# Patient Record
Sex: MALE | Race: WHITE | NOT HISPANIC OR LATINO
[De-identification: names, ages, dates, MRNs, and addresses within clinical notes are randomized per-mention and may not be internally consistent; named-entity substitution may affect disease eponyms.]

---

## 2018-06-28 ENCOUNTER — TRANSCRIPTION ENCOUNTER (OUTPATIENT)
Age: 33
End: 2018-06-28

## 2018-10-18 ENCOUNTER — TRANSCRIPTION ENCOUNTER (OUTPATIENT)
Age: 33
End: 2018-10-18

## 2021-09-17 ENCOUNTER — TRANSCRIPTION ENCOUNTER (OUTPATIENT)
Age: 36
End: 2021-09-17

## 2021-09-18 ENCOUNTER — INPATIENT (INPATIENT)
Facility: HOSPITAL | Age: 36
LOS: 0 days | Discharge: HOME | DRG: 342 | End: 2021-09-19
Attending: SURGERY | Admitting: SURGERY
Payer: MEDICAID

## 2021-09-18 ENCOUNTER — RESULT REVIEW (OUTPATIENT)
Age: 36
End: 2021-09-18

## 2021-09-18 VITALS
OXYGEN SATURATION: 99 % | TEMPERATURE: 98 F | HEIGHT: 68 IN | SYSTOLIC BLOOD PRESSURE: 123 MMHG | DIASTOLIC BLOOD PRESSURE: 84 MMHG | RESPIRATION RATE: 16 BRPM | WEIGHT: 315 LBS | HEART RATE: 86 BPM

## 2021-09-18 DIAGNOSIS — R10.9 UNSPECIFIED ABDOMINAL PAIN: ICD-10-CM

## 2021-09-18 DIAGNOSIS — K35.80 UNSPECIFIED ACUTE APPENDICITIS: ICD-10-CM

## 2021-09-18 DIAGNOSIS — Z98.890 OTHER SPECIFIED POSTPROCEDURAL STATES: Chronic | ICD-10-CM

## 2021-09-18 LAB
ALBUMIN SERPL ELPH-MCNC: 3.3 G/DL — SIGNIFICANT CHANGE UP (ref 3.3–5)
ALP SERPL-CCNC: 74 U/L — SIGNIFICANT CHANGE UP (ref 40–120)
ALT FLD-CCNC: 72 U/L — SIGNIFICANT CHANGE UP (ref 12–78)
AMYLASE P1 CFR SERPL: 30 U/L — SIGNIFICANT CHANGE UP (ref 25–125)
ANION GAP SERPL CALC-SCNC: 8 MMOL/L — SIGNIFICANT CHANGE UP (ref 5–17)
APPEARANCE UR: ABNORMAL
APTT BLD: 36.8 SEC — HIGH (ref 27.5–35.5)
AST SERPL-CCNC: 40 U/L — HIGH (ref 15–37)
BACTERIA # UR AUTO: ABNORMAL
BASOPHILS # BLD AUTO: 0.03 K/UL — SIGNIFICANT CHANGE UP (ref 0–0.2)
BASOPHILS NFR BLD AUTO: 0.3 % — SIGNIFICANT CHANGE UP (ref 0–2)
BILIRUB SERPL-MCNC: 1.1 MG/DL — SIGNIFICANT CHANGE UP (ref 0.2–1.2)
BILIRUB UR-MCNC: NEGATIVE — SIGNIFICANT CHANGE UP
BUN SERPL-MCNC: 11 MG/DL — SIGNIFICANT CHANGE UP (ref 7–23)
CALCIUM SERPL-MCNC: 8.6 MG/DL — SIGNIFICANT CHANGE UP (ref 8.5–10.1)
CHLORIDE SERPL-SCNC: 106 MMOL/L — SIGNIFICANT CHANGE UP (ref 96–108)
CO2 SERPL-SCNC: 27 MMOL/L — SIGNIFICANT CHANGE UP (ref 22–31)
COLOR SPEC: YELLOW — SIGNIFICANT CHANGE UP
COMMENT - URINE: SIGNIFICANT CHANGE UP
CREAT SERPL-MCNC: 0.83 MG/DL — SIGNIFICANT CHANGE UP (ref 0.5–1.3)
DIFF PNL FLD: NEGATIVE — SIGNIFICANT CHANGE UP
EOSINOPHIL # BLD AUTO: 0.12 K/UL — SIGNIFICANT CHANGE UP (ref 0–0.5)
EOSINOPHIL NFR BLD AUTO: 1 % — SIGNIFICANT CHANGE UP (ref 0–6)
EPI CELLS # UR: SIGNIFICANT CHANGE UP
GLUCOSE SERPL-MCNC: 103 MG/DL — HIGH (ref 70–99)
GLUCOSE UR QL: NEGATIVE — SIGNIFICANT CHANGE UP
HCT VFR BLD CALC: 46.8 % — SIGNIFICANT CHANGE UP (ref 39–50)
HGB BLD-MCNC: 16.5 G/DL — SIGNIFICANT CHANGE UP (ref 13–17)
IMM GRANULOCYTES NFR BLD AUTO: 0.3 % — SIGNIFICANT CHANGE UP (ref 0–1.5)
INR BLD: 1.61 RATIO — HIGH (ref 0.88–1.16)
KETONES UR-MCNC: ABNORMAL
LEUKOCYTE ESTERASE UR-ACNC: NEGATIVE — SIGNIFICANT CHANGE UP
LIDOCAIN IGE QN: 118 U/L — SIGNIFICANT CHANGE UP (ref 73–393)
LYMPHOCYTES # BLD AUTO: 1.6 K/UL — SIGNIFICANT CHANGE UP (ref 1–3.3)
LYMPHOCYTES # BLD AUTO: 13.4 % — SIGNIFICANT CHANGE UP (ref 13–44)
MCHC RBC-ENTMCNC: 33.7 PG — SIGNIFICANT CHANGE UP (ref 27–34)
MCHC RBC-ENTMCNC: 35.3 GM/DL — SIGNIFICANT CHANGE UP (ref 32–36)
MCV RBC AUTO: 95.7 FL — SIGNIFICANT CHANGE UP (ref 80–100)
MONOCYTES # BLD AUTO: 0.77 K/UL — SIGNIFICANT CHANGE UP (ref 0–0.9)
MONOCYTES NFR BLD AUTO: 6.5 % — SIGNIFICANT CHANGE UP (ref 2–14)
NEUTROPHILS # BLD AUTO: 9.36 K/UL — HIGH (ref 1.8–7.4)
NEUTROPHILS NFR BLD AUTO: 78.5 % — HIGH (ref 43–77)
NITRITE UR-MCNC: NEGATIVE — SIGNIFICANT CHANGE UP
NRBC # BLD: 0 /100 WBCS — SIGNIFICANT CHANGE UP (ref 0–0)
PH UR: 7 — SIGNIFICANT CHANGE UP (ref 5–8)
PLATELET # BLD AUTO: 225 K/UL — SIGNIFICANT CHANGE UP (ref 150–400)
POTASSIUM SERPL-MCNC: 3.5 MMOL/L — SIGNIFICANT CHANGE UP (ref 3.5–5.3)
POTASSIUM SERPL-SCNC: 3.5 MMOL/L — SIGNIFICANT CHANGE UP (ref 3.5–5.3)
PROT SERPL-MCNC: 7.5 G/DL — SIGNIFICANT CHANGE UP (ref 6–8.3)
PROT UR-MCNC: NEGATIVE — SIGNIFICANT CHANGE UP
PROTHROM AB SERPL-ACNC: 18.4 SEC — HIGH (ref 10.6–13.6)
RBC # BLD: 4.89 M/UL — SIGNIFICANT CHANGE UP (ref 4.2–5.8)
RBC # FLD: 12.9 % — SIGNIFICANT CHANGE UP (ref 10.3–14.5)
RBC CASTS # UR COMP ASSIST: SIGNIFICANT CHANGE UP /HPF (ref 0–4)
SARS-COV-2 RNA SPEC QL NAA+PROBE: SIGNIFICANT CHANGE UP
SODIUM SERPL-SCNC: 141 MMOL/L — SIGNIFICANT CHANGE UP (ref 135–145)
SP GR SPEC: 1.01 — SIGNIFICANT CHANGE UP (ref 1.01–1.02)
UROBILINOGEN FLD QL: NEGATIVE — SIGNIFICANT CHANGE UP
WBC # BLD: 11.92 K/UL — HIGH (ref 3.8–10.5)
WBC # FLD AUTO: 11.92 K/UL — HIGH (ref 3.8–10.5)
WBC UR QL: SIGNIFICANT CHANGE UP

## 2021-09-18 PROCEDURE — 99285 EMERGENCY DEPT VISIT HI MDM: CPT

## 2021-09-18 PROCEDURE — 88304 TISSUE EXAM BY PATHOLOGIST: CPT | Mod: 26

## 2021-09-18 PROCEDURE — 99223 1ST HOSP IP/OBS HIGH 75: CPT | Mod: 57

## 2021-09-18 PROCEDURE — 99233 SBSQ HOSP IP/OBS HIGH 50: CPT

## 2021-09-18 PROCEDURE — 44970 LAPAROSCOPY APPENDECTOMY: CPT | Mod: AS

## 2021-09-18 PROCEDURE — 44970 LAPAROSCOPY APPENDECTOMY: CPT

## 2021-09-18 PROCEDURE — 74177 CT ABD & PELVIS W/CONTRAST: CPT | Mod: 26,MA

## 2021-09-18 RX ORDER — PIPERACILLIN AND TAZOBACTAM 4; .5 G/20ML; G/20ML
3.38 INJECTION, POWDER, LYOPHILIZED, FOR SOLUTION INTRAVENOUS EVERY 8 HOURS
Refills: 0 | Status: DISCONTINUED | OUTPATIENT
Start: 2021-09-18 | End: 2021-09-18

## 2021-09-18 RX ORDER — ONDANSETRON 8 MG/1
4 TABLET, FILM COATED ORAL ONCE
Refills: 0 | Status: DISCONTINUED | OUTPATIENT
Start: 2021-09-18 | End: 2021-09-18

## 2021-09-18 RX ORDER — MORPHINE SULFATE 50 MG/1
4 CAPSULE, EXTENDED RELEASE ORAL ONCE
Refills: 0 | Status: DISCONTINUED | OUTPATIENT
Start: 2021-09-18 | End: 2021-09-18

## 2021-09-18 RX ORDER — OXYCODONE HYDROCHLORIDE 5 MG/1
10 TABLET ORAL EVERY 4 HOURS
Refills: 0 | Status: DISCONTINUED | OUTPATIENT
Start: 2021-09-18 | End: 2021-09-19

## 2021-09-18 RX ORDER — OMEPRAZOLE 10 MG/1
1 CAPSULE, DELAYED RELEASE ORAL
Qty: 0 | Refills: 0 | DISCHARGE

## 2021-09-18 RX ORDER — HEPARIN SODIUM 5000 [USP'U]/ML
5000 INJECTION INTRAVENOUS; SUBCUTANEOUS EVERY 8 HOURS
Refills: 0 | Status: DISCONTINUED | OUTPATIENT
Start: 2021-09-18 | End: 2021-09-19

## 2021-09-18 RX ORDER — SODIUM CHLORIDE 9 MG/ML
1000 INJECTION, SOLUTION INTRAVENOUS
Refills: 0 | Status: DISCONTINUED | OUTPATIENT
Start: 2021-09-18 | End: 2021-09-18

## 2021-09-18 RX ORDER — PIPERACILLIN AND TAZOBACTAM 4; .5 G/20ML; G/20ML
3.38 INJECTION, POWDER, LYOPHILIZED, FOR SOLUTION INTRAVENOUS ONCE
Refills: 0 | Status: COMPLETED | OUTPATIENT
Start: 2021-09-18 | End: 2021-09-18

## 2021-09-18 RX ORDER — MAGNESIUM CHLORIDE
0 CRYSTALS MISCELLANEOUS
Qty: 0 | Refills: 0 | DISCHARGE

## 2021-09-18 RX ORDER — SODIUM CHLORIDE 9 MG/ML
1000 INJECTION INTRAMUSCULAR; INTRAVENOUS; SUBCUTANEOUS
Refills: 0 | Status: DISCONTINUED | OUTPATIENT
Start: 2021-09-18 | End: 2021-09-18

## 2021-09-18 RX ORDER — IOHEXOL 300 MG/ML
15 INJECTION, SOLUTION INTRAVENOUS
Refills: 0 | Status: COMPLETED | OUTPATIENT
Start: 2021-09-18 | End: 2021-09-18

## 2021-09-18 RX ORDER — ONDANSETRON 8 MG/1
4 TABLET, FILM COATED ORAL EVERY 6 HOURS
Refills: 0 | Status: DISCONTINUED | OUTPATIENT
Start: 2021-09-18 | End: 2021-09-19

## 2021-09-18 RX ORDER — FLUOXETINE HCL 10 MG
0 CAPSULE ORAL
Qty: 0 | Refills: 0 | DISCHARGE

## 2021-09-18 RX ORDER — OMEPRAZOLE 10 MG/1
0 CAPSULE, DELAYED RELEASE ORAL
Qty: 0 | Refills: 0 | DISCHARGE

## 2021-09-18 RX ORDER — PHYTONADIONE (VIT K1) 5 MG
10 TABLET ORAL ONCE
Refills: 0 | Status: COMPLETED | OUTPATIENT
Start: 2021-09-18 | End: 2021-09-18

## 2021-09-18 RX ORDER — ACETAMINOPHEN 500 MG
1000 TABLET ORAL ONCE
Refills: 0 | Status: COMPLETED | OUTPATIENT
Start: 2021-09-18 | End: 2021-09-18

## 2021-09-18 RX ORDER — FLUOXETINE HCL 10 MG
20 CAPSULE ORAL DAILY
Refills: 0 | Status: DISCONTINUED | OUTPATIENT
Start: 2021-09-18 | End: 2021-09-19

## 2021-09-18 RX ORDER — INFLUENZA VIRUS VACCINE 15; 15; 15; 15 UG/.5ML; UG/.5ML; UG/.5ML; UG/.5ML
0.5 SUSPENSION INTRAMUSCULAR ONCE
Refills: 0 | Status: DISCONTINUED | OUTPATIENT
Start: 2021-09-18 | End: 2021-09-19

## 2021-09-18 RX ORDER — ACETAMINOPHEN 500 MG
1000 TABLET ORAL EVERY 6 HOURS
Refills: 0 | Status: DISCONTINUED | OUTPATIENT
Start: 2021-09-18 | End: 2021-09-19

## 2021-09-18 RX ORDER — HYDROMORPHONE HYDROCHLORIDE 2 MG/ML
0.5 INJECTION INTRAMUSCULAR; INTRAVENOUS; SUBCUTANEOUS
Refills: 0 | Status: DISCONTINUED | OUTPATIENT
Start: 2021-09-18 | End: 2021-09-18

## 2021-09-18 RX ORDER — PANTOPRAZOLE SODIUM 20 MG/1
40 TABLET, DELAYED RELEASE ORAL
Refills: 0 | Status: DISCONTINUED | OUTPATIENT
Start: 2021-09-18 | End: 2021-09-19

## 2021-09-18 RX ORDER — OXYCODONE HYDROCHLORIDE 5 MG/1
5 TABLET ORAL EVERY 4 HOURS
Refills: 0 | Status: DISCONTINUED | OUTPATIENT
Start: 2021-09-18 | End: 2021-09-19

## 2021-09-18 RX ORDER — MAGNESIUM OXIDE 400 MG ORAL TABLET 241.3 MG
400 TABLET ORAL DAILY
Refills: 0 | Status: DISCONTINUED | OUTPATIENT
Start: 2021-09-18 | End: 2021-09-19

## 2021-09-18 RX ORDER — SODIUM CHLORIDE 9 MG/ML
1000 INJECTION INTRAMUSCULAR; INTRAVENOUS; SUBCUTANEOUS ONCE
Refills: 0 | Status: COMPLETED | OUTPATIENT
Start: 2021-09-18 | End: 2021-09-18

## 2021-09-18 RX ORDER — ONDANSETRON 8 MG/1
4 TABLET, FILM COATED ORAL ONCE
Refills: 0 | Status: COMPLETED | OUTPATIENT
Start: 2021-09-18 | End: 2021-09-18

## 2021-09-18 RX ORDER — ERGOCALCIFEROL 1.25 MG/1
0 CAPSULE ORAL
Qty: 0 | Refills: 0 | DISCHARGE

## 2021-09-18 RX ADMIN — SODIUM CHLORIDE 100 MILLILITER(S): 9 INJECTION, SOLUTION INTRAVENOUS at 14:16

## 2021-09-18 RX ADMIN — Medication 1000 MILLIGRAM(S): at 23:04

## 2021-09-18 RX ADMIN — PIPERACILLIN AND TAZOBACTAM 200 GRAM(S): 4; .5 INJECTION, POWDER, LYOPHILIZED, FOR SOLUTION INTRAVENOUS at 09:53

## 2021-09-18 RX ADMIN — Medication 1000 MILLIGRAM(S): at 18:01

## 2021-09-18 RX ADMIN — HEPARIN SODIUM 5000 UNIT(S): 5000 INJECTION INTRAVENOUS; SUBCUTANEOUS at 21:22

## 2021-09-18 RX ADMIN — IOHEXOL 15 MILLILITER(S): 300 INJECTION, SOLUTION INTRAVENOUS at 05:10

## 2021-09-18 RX ADMIN — Medication 400 MILLIGRAM(S): at 14:16

## 2021-09-18 RX ADMIN — SODIUM CHLORIDE 1000 MILLILITER(S): 9 INJECTION INTRAMUSCULAR; INTRAVENOUS; SUBCUTANEOUS at 05:10

## 2021-09-18 RX ADMIN — ONDANSETRON 4 MILLIGRAM(S): 8 TABLET, FILM COATED ORAL at 05:10

## 2021-09-18 RX ADMIN — IOHEXOL 15 MILLILITER(S): 300 INJECTION, SOLUTION INTRAVENOUS at 06:22

## 2021-09-18 RX ADMIN — Medication 1000 MILLIGRAM(S): at 17:01

## 2021-09-18 RX ADMIN — SODIUM CHLORIDE 100 MILLILITER(S): 9 INJECTION INTRAMUSCULAR; INTRAVENOUS; SUBCUTANEOUS at 09:53

## 2021-09-18 RX ADMIN — MORPHINE SULFATE 4 MILLIGRAM(S): 50 CAPSULE, EXTENDED RELEASE ORAL at 05:10

## 2021-09-18 NOTE — ED ADULT TRIAGE NOTE - CHIEF COMPLAINT QUOTE
Patient complaining of right lower abdominal pain started yesterday was worst tonight with vomiting was constipated had small bowel movement denies taking any pain medication

## 2021-09-18 NOTE — H&P ADULT - NSICDXPASTSURGICALHX_GEN_ALL_CORE_FT
PAST SURGICAL HISTORY:  History of excision of pilonidal cyst     History of repair of pyloric stenosis

## 2021-09-18 NOTE — ED PROVIDER NOTE - CLINICAL SUMMARY MEDICAL DECISION MAKING FREE TEXT BOX
36 year old male p/w RLQ pain, vomiting, and constipation since yesterday.  No fever, diarrhea.  Labs, UA, analgesia.  CT.  Evaluate for kidney stone vs appendicitis

## 2021-09-18 NOTE — H&P ADULT - NSHPSOCIALHISTORY_GEN_ALL_CORE
Non-smoker   Drinks alcohol, 1 drink per day  Denies illicit drug use  Works as   Single, no children

## 2021-09-18 NOTE — BRIEF OPERATIVE NOTE - OPERATION/FINDINGS
Retroperitoneal appendix, fibrosed and matted to TI and anterior abdominal wall.  No appreciable perforation.

## 2021-09-18 NOTE — H&P ADULT - NSHPLABSRESULTS_GEN_ALL_CORE
16.5   11.92 )-----------( 225      ( 18 Sep 2021 05:12 )             46.8   09-18    141  |  106  |  11  ----------------------------<  103<H>  3.5   |  27  |  0.83    Ca    8.6      18 Sep 2021 05:12    TPro  7.5  /  Alb  3.3  /  TBili  1.1  /  DBili  x   /  AST  40<H>  /  ALT  72  /  AlkPhos  74  09-18  PT/INR - ( 18 Sep 2021 10:25 )   PT: 18.4 sec;   INR: 1.61 ratio    PTT - ( 18 Sep 2021 10:25 )  PTT:36.8 sec    IV/PO abd/pelvic CT:   IMPRESSION:  Distended appendix with periappendiceal inflammatory changes suggesting acute appendicitis. Mild wall thickening of the terminal ileum is presumably due to secondary inflammation. Please correlate clinically.  Hepatic steatosis.  (see results for full reading)

## 2021-09-18 NOTE — ED PROVIDER NOTE - CARE PLAN
Principal Discharge DX:	Abdominal pain   1 Principal Discharge DX:	Abdominal pain  Secondary Diagnosis:	Appendicitis

## 2021-09-18 NOTE — H&P ADULT - ATTENDING COMMENTS
Patient seen and examined in the ER with the surgical PA.  The CT scan report and images were reviewed and revealed a distended appendix with periappendiceal stranding which was felt to suggest acute appendicitis.  In addition, there was wall thickening of the terminal ileum which was felt to possibly represent secondary inflammation.  He has an obese abdomen with localized tenderness in the right lower quadrant.  I discuss the above CT findings with the patient and have recommended a laparoscopic appendectomy.  The risks of surgery, including but not limited to, bleeding, infection (including abscess formation), damage to surrounding structures, conversion to open surgery, and hernia formation, were discussed with the patient who understands these risks and consents to the planned surgery.  All questions were answered.  Will give vitamin K and have 2U FFP on hold for OR in light of his mild coagulopathy.

## 2021-09-18 NOTE — ED PROVIDER NOTE - OBJECTIVE STATEMENT
36 year old male with a history of anxiety presents with abdominal pain and vomiting.  Patient states he woke up yesterday morning with dull RLQ pain, but he was able to go to work.  Last night, the pain increased around midnight.  It is associated with 2 episodes of vomiting.  The pain is constant and radiates to his right groin at times.  No d/f/dysuria, hematuria.   He reports constipation. He took Gas-x without relief.  PMD Mina Cruz

## 2021-09-18 NOTE — CONSULT NOTE ADULT - TIME BILLING
Chart review, examination, documentation, care coordination and counseling.  Discussed with respiratory tech

## 2021-09-18 NOTE — CONSULT NOTE ADULT - ASSESSMENT
35yo male w/ hx of obesity, YUE (on home cpap) presents with acute appendicitis  s/p lap appendectomy 9/18.   Medicine consult for co- management    Acute appendicitis  Admit to Dr. Tucker, surgery  NPO/IVF/IV Zosyn  s/p Lap appendectomy 9/18  Advance diet per surgery    Obstructive sleep apnea, chronic  On CPAP at home, settings at home is automatic per pt  CPAP ordered, discussed with respiratory tech (for ED on call); will titrate overnight  Cont pulse ox    Abnormal coagulation factors  Coag factors elevated with INR 1.61, unclear etiology  s/p vitamin K, FFP prior to OR 9/18  No fam hx of factor deficiency/hematologic disease per patient  ?possibly related to fatty liver disease  F/u AM coags    Hepatic steatosis  - seen on CT, f/u lipid panel  - nutrition consult  - outpt. follow up    Hx of  anxiety  - on fluoxetine, continue    DVT ppx: per surgery, heparin

## 2021-09-18 NOTE — ED ADULT NURSE NOTE - OBJECTIVE STATEMENT
patient a/o x 4 with a calm affect c/o RLQ abd pain and nausea since last night. patient pending initial lab results, and has begun drinking oral contrast for CT scan

## 2021-09-18 NOTE — ED PROVIDER NOTE - CONSTITUTIONAL, MLM
normal... Well appearing, awake, alert, oriented to person, place, time/situation and in no apparent distress. Morbid obesity

## 2021-09-18 NOTE — ED ADULT NURSE REASSESSMENT NOTE - NS ED NURSE REASSESS COMMENT FT1
Patient received from RN. Pt A/Ox3 resting in stretcher comfortably. All VSS- +chest rise, . No c/o CP/dizziness/HA/SOB @this time. No acute distress noted. Will continue to monitor.

## 2021-09-18 NOTE — H&P ADULT - PROBLEM SELECTOR PLAN 1
Admit to Dr. Tucker   NPO/IVF/IV Zosyn  Patient will require surgical appendectomy - discussed risks/benefits  Patient had opportunity to have all questions asked and answered, agrees to proceed  Consent obtained   Coags abnormal despite no AC - vit. K and 2 units FFP ordered pre-op   Will have medicine see patient post-op

## 2021-09-18 NOTE — H&P ADULT - HISTORY OF PRESENT ILLNESS
36-yo male with pmhx of pyloric stenosis, anxiety, & sleep apnea presents with c/o RLQ abdominal pain that began yesterday morning.  Pain is localized to the RLQ, with occasional radiation to the groin.  Denies any inciting factors, states that pain progressively worsened since onset until 0400 last night, when he presented for evaluation and treatment.  Reports 2 episodes of NB/NB vomiting associated since pain onset.  Denies fevers, chills, sweats.  Further denies CP/SOB/GONZALEZ/palpitations/dizziness/lightheadedness.

## 2021-09-18 NOTE — CONSULT NOTE ADULT - SUBJECTIVE AND OBJECTIVE BOX
HPI: 36-yo male with pmhx of pyloric stenosis, anxiety, & sleep apnea presents with c/o RLQ abdominal pain that began yesterday morning.  Pain is localized to the RLQ, with occasional radiation to the groin.  Denies any inciting factors, states that pain progressively worsened since onset. Reports 2 episodes of NB/NB vomiting associated since pain onset.  Denies fevers, chills, sweats. Now s/p lap appendectomy, in mild discomfort but no acute complains.     CONSTITUTIONAL: denies fever, chills, fatigue, weakness  HEENT: denies blurred vision, sore throat  SKIN: denies new lesions, rash  CARDIOVASCULAR: denies chest pain, chest pressure, palpitations  RESPIRATORY: denies shortness of breath, sputum production  GASTROINTESTINAL+ abdominal pain  GENITOURINARY: denies dysuria, discharge  NEUROLOGICAL: denies numbness, headache, focal weakness  MUSCULOSKELETAL: denies new joint pain, muscle aches  HEMATOLOGIC: denies gross bleeding, bruising    PAST MEDICAL HISTORY:  Anxiety   H/O pyloric stenosis    PAST SURGICAL HISTORY:  History of excision of pilonidal cyst   History of repair of pyloric stenosis    FAMILY HISTORY:  No pertinent family history in first degree relatives    Allergies: None    Physical exam  Vital Signs Last 24 Hrs  T(C): 36.8 (18 Sep 2021 14:31), Max: 37.2 (18 Sep 2021 07:12)  T(F): 98.2 (18 Sep 2021 14:31), Max: 99 (18 Sep 2021 07:12)  HR: 75 (18 Sep 2021 15:01) (71 - 86)  BP: 97/54 (18 Sep 2021 15:01) (97/54 - 130/90)  BP(mean): 103 (18 Sep 2021 10:43) (103 - 103)  RR: 18 (18 Sep 2021 15:01) (16 - 18)  SpO2: 96% (18 Sep 2021 15:01) (93% - 99%)    Examined in PACU post surgery    GENERAL: patient appears well, no acute distress, appropriate  EYES: sclera clear, no exudates, PERRLA  ENMT: oropharynx clear without erythema, no exudates, moist mucous membranes  NECK: supple, soft, no thyromegaly noted  LUNGS:  clear to auscultation,  no rales, wheezing or rhonchi appreciated  HEART: S1/S2, regular rate and rhythm, no murmurs noted, no lower extremity edema, pulses  GASTROINTESTINAL: abdomen is soft, +diffusely tender, bandage on umbilical area, mild distended, hypoactive BS  INTEGUMENT: good skin turgor, warm, dry and intact  MUSCULOSKELETAL: no clubbing or cyanosis, no obvious deformity  NEUROLOGIC: awake, alert, oriented x3, strength no obvious sensory deficits  PSYCHIATRIC: mood is good, affect is congruent, linear and logical thought process  HEME/LYMPH:  no obvious ecchymosis or petechiae

## 2021-09-19 ENCOUNTER — TRANSCRIPTION ENCOUNTER (OUTPATIENT)
Age: 36
End: 2021-09-19

## 2021-09-19 VITALS
WEIGHT: 314.82 LBS | OXYGEN SATURATION: 98 % | TEMPERATURE: 97 F | RESPIRATION RATE: 17 BRPM | DIASTOLIC BLOOD PRESSURE: 63 MMHG | HEART RATE: 85 BPM | SYSTOLIC BLOOD PRESSURE: 109 MMHG

## 2021-09-19 LAB
ANION GAP SERPL CALC-SCNC: 7 MMOL/L — SIGNIFICANT CHANGE UP (ref 5–17)
APTT BLD: 31.2 SEC — SIGNIFICANT CHANGE UP (ref 27.5–35.5)
BASOPHILS # BLD AUTO: 0.02 K/UL — SIGNIFICANT CHANGE UP (ref 0–0.2)
BASOPHILS NFR BLD AUTO: 0.2 % — SIGNIFICANT CHANGE UP (ref 0–2)
BILIRUB SERPL-MCNC: 0.9 MG/DL — SIGNIFICANT CHANGE UP (ref 0.2–1.2)
BUN SERPL-MCNC: 10 MG/DL — SIGNIFICANT CHANGE UP (ref 7–23)
CALCIUM SERPL-MCNC: 8.3 MG/DL — LOW (ref 8.5–10.1)
CHLORIDE SERPL-SCNC: 108 MMOL/L — SIGNIFICANT CHANGE UP (ref 96–108)
CHOLEST SERPL-MCNC: 211 MG/DL — HIGH
CO2 SERPL-SCNC: 27 MMOL/L — SIGNIFICANT CHANGE UP (ref 22–31)
COVID-19 SPIKE DOMAIN AB INTERP: POSITIVE
COVID-19 SPIKE DOMAIN ANTIBODY RESULT: >250 U/ML — HIGH
CREAT SERPL-MCNC: 0.75 MG/DL — SIGNIFICANT CHANGE UP (ref 0.5–1.3)
EOSINOPHIL # BLD AUTO: 0 K/UL — SIGNIFICANT CHANGE UP (ref 0–0.5)
EOSINOPHIL NFR BLD AUTO: 0 % — SIGNIFICANT CHANGE UP (ref 0–6)
GLUCOSE SERPL-MCNC: 108 MG/DL — HIGH (ref 70–99)
HCT VFR BLD CALC: 46 % — SIGNIFICANT CHANGE UP (ref 39–50)
HDLC SERPL-MCNC: 38 MG/DL — LOW
HGB BLD-MCNC: 15.4 G/DL — SIGNIFICANT CHANGE UP (ref 13–17)
IMM GRANULOCYTES NFR BLD AUTO: 0.5 % — SIGNIFICANT CHANGE UP (ref 0–1.5)
INR BLD: 1.13 RATIO — SIGNIFICANT CHANGE UP (ref 0.88–1.16)
LIPID PNL WITH DIRECT LDL SERPL: 149 MG/DL — HIGH
LYMPHOCYTES # BLD AUTO: 1.33 K/UL — SIGNIFICANT CHANGE UP (ref 1–3.3)
LYMPHOCYTES # BLD AUTO: 10.2 % — LOW (ref 13–44)
MCHC RBC-ENTMCNC: 33.5 GM/DL — SIGNIFICANT CHANGE UP (ref 32–36)
MCHC RBC-ENTMCNC: 33.5 PG — SIGNIFICANT CHANGE UP (ref 27–34)
MCV RBC AUTO: 100 FL — SIGNIFICANT CHANGE UP (ref 80–100)
MELD SCORE WITH DIALYSIS: 21 POINTS — SIGNIFICANT CHANGE UP
MELD SCORE WITHOUT DIALYSIS: 8 POINTS — SIGNIFICANT CHANGE UP
MONOCYTES # BLD AUTO: 0.64 K/UL — SIGNIFICANT CHANGE UP (ref 0–0.9)
MONOCYTES NFR BLD AUTO: 4.9 % — SIGNIFICANT CHANGE UP (ref 2–14)
NEUTROPHILS # BLD AUTO: 10.99 K/UL — HIGH (ref 1.8–7.4)
NEUTROPHILS NFR BLD AUTO: 84.2 % — HIGH (ref 43–77)
NON HDL CHOLESTEROL: 174 MG/DL — HIGH
NRBC # BLD: 0 /100 WBCS — SIGNIFICANT CHANGE UP (ref 0–0)
PLATELET # BLD AUTO: 204 K/UL — SIGNIFICANT CHANGE UP (ref 150–400)
POTASSIUM SERPL-MCNC: 3.8 MMOL/L — SIGNIFICANT CHANGE UP (ref 3.5–5.3)
POTASSIUM SERPL-SCNC: 3.8 MMOL/L — SIGNIFICANT CHANGE UP (ref 3.5–5.3)
PROTHROM AB SERPL-ACNC: 13.1 SEC — SIGNIFICANT CHANGE UP (ref 10.6–13.6)
RBC # BLD: 4.6 M/UL — SIGNIFICANT CHANGE UP (ref 4.2–5.8)
RBC # FLD: 12.9 % — SIGNIFICANT CHANGE UP (ref 10.3–14.5)
SARS-COV-2 IGG+IGM SERPL QL IA: >250 U/ML — HIGH
SARS-COV-2 IGG+IGM SERPL QL IA: POSITIVE
SODIUM SERPL-SCNC: 142 MMOL/L — SIGNIFICANT CHANGE UP (ref 135–145)
TRIGL SERPL-MCNC: 123 MG/DL — SIGNIFICANT CHANGE UP
WBC # BLD: 13.04 K/UL — HIGH (ref 3.8–10.5)
WBC # FLD AUTO: 13.04 K/UL — HIGH (ref 3.8–10.5)

## 2021-09-19 PROCEDURE — 81001 URINALYSIS AUTO W/SCOPE: CPT

## 2021-09-19 PROCEDURE — 86850 RBC ANTIBODY SCREEN: CPT

## 2021-09-19 PROCEDURE — 96374 THER/PROPH/DIAG INJ IV PUSH: CPT

## 2021-09-19 PROCEDURE — 74177 CT ABD & PELVIS W/CONTRAST: CPT | Mod: MA

## 2021-09-19 PROCEDURE — 85610 PROTHROMBIN TIME: CPT

## 2021-09-19 PROCEDURE — C1889: CPT

## 2021-09-19 PROCEDURE — P9059: CPT

## 2021-09-19 PROCEDURE — 80061 LIPID PANEL: CPT

## 2021-09-19 PROCEDURE — 87635 SARS-COV-2 COVID-19 AMP PRB: CPT

## 2021-09-19 PROCEDURE — 88304 TISSUE EXAM BY PATHOLOGIST: CPT

## 2021-09-19 PROCEDURE — 86901 BLOOD TYPING SEROLOGIC RH(D): CPT

## 2021-09-19 PROCEDURE — 36415 COLL VENOUS BLD VENIPUNCTURE: CPT

## 2021-09-19 PROCEDURE — 85025 COMPLETE CBC W/AUTO DIFF WBC: CPT

## 2021-09-19 PROCEDURE — 83690 ASSAY OF LIPASE: CPT

## 2021-09-19 PROCEDURE — 36430 TRANSFUSION BLD/BLD COMPNT: CPT

## 2021-09-19 PROCEDURE — 99285 EMERGENCY DEPT VISIT HI MDM: CPT | Mod: 25

## 2021-09-19 PROCEDURE — 80053 COMPREHEN METABOLIC PANEL: CPT

## 2021-09-19 PROCEDURE — 80048 BASIC METABOLIC PNL TOTAL CA: CPT

## 2021-09-19 PROCEDURE — 85730 THROMBOPLASTIN TIME PARTIAL: CPT

## 2021-09-19 PROCEDURE — 86900 BLOOD TYPING SEROLOGIC ABO: CPT

## 2021-09-19 PROCEDURE — 99232 SBSQ HOSP IP/OBS MODERATE 35: CPT

## 2021-09-19 PROCEDURE — 86769 SARS-COV-2 COVID-19 ANTIBODY: CPT

## 2021-09-19 PROCEDURE — 82150 ASSAY OF AMYLASE: CPT

## 2021-09-19 PROCEDURE — 96375 TX/PRO/DX INJ NEW DRUG ADDON: CPT

## 2021-09-19 RX ADMIN — Medication 20 MILLIGRAM(S): at 11:27

## 2021-09-19 RX ADMIN — OXYCODONE HYDROCHLORIDE 10 MILLIGRAM(S): 5 TABLET ORAL at 10:23

## 2021-09-19 RX ADMIN — Medication 1000 MILLIGRAM(S): at 05:09

## 2021-09-19 RX ADMIN — MAGNESIUM OXIDE 400 MG ORAL TABLET 400 MILLIGRAM(S): 241.3 TABLET ORAL at 11:27

## 2021-09-19 RX ADMIN — HEPARIN SODIUM 5000 UNIT(S): 5000 INJECTION INTRAVENOUS; SUBCUTANEOUS at 05:09

## 2021-09-19 RX ADMIN — HEPARIN SODIUM 5000 UNIT(S): 5000 INJECTION INTRAVENOUS; SUBCUTANEOUS at 11:32

## 2021-09-19 RX ADMIN — Medication 1 TABLET(S): at 11:27

## 2021-09-19 RX ADMIN — OXYCODONE HYDROCHLORIDE 10 MILLIGRAM(S): 5 TABLET ORAL at 09:23

## 2021-09-19 RX ADMIN — PANTOPRAZOLE SODIUM 40 MILLIGRAM(S): 20 TABLET, DELAYED RELEASE ORAL at 05:10

## 2021-09-19 RX ADMIN — Medication 1000 MILLIGRAM(S): at 05:10

## 2021-09-19 RX ADMIN — Medication 1000 MILLIGRAM(S): at 11:27

## 2021-09-19 NOTE — PROGRESS NOTE ADULT - ATTENDING COMMENTS
Patient seen and examined with the surgical PA.  He states that he is feeling well and that his pain is well controlled.  He has been tolerating a regular diet and ambulating.  His abdomen is soft, obese, and non-tender.  The incisions are clean with steri-strips.  There is mild ecchymosis around the inferior aspect of the umbilicus.  He is stable for discharge home today.  Post-op instructions and follow-up information were reviewed with the patient.

## 2021-09-19 NOTE — PROGRESS NOTE ADULT - PROBLEM SELECTOR PLAN 1
- incentive spirometer  - pain control  - OOB  - Reg diet  - d/c pending AM labs    Surgical Team Contact Information  Spectralink: Ext: [phone] or [phone]  Pager: [phone]

## 2021-09-19 NOTE — DISCHARGE NOTE PROVIDER - NSDCMRMEDTOKEN_GEN_ALL_CORE_FT
FLUoxetine 20 mg oral capsule: 1 cap(s) orally once a day   mg oral tablet: 1 tab(s) orally every 6 hours, As Needed for pain  magnesium oxide 400 mg oral tablet: 1 tab(s) orally once a day  multivitamin: 1 tab(s) orally once a day  omeprazole 40 mg oral delayed release capsule: 1 cap(s) orally once a day  Tylenol 500 mg oral tablet: 2 tab(s) orally every 6 hours, As Needed for pain  Vitamin D3 125 mcg (5000 intl units) oral tablet: 1 tab(s) orally once a day

## 2021-09-19 NOTE — DISCHARGE NOTE NURSING/CASE MANAGEMENT/SOCIAL WORK - PATIENT PORTAL LINK FT
You can access the FollowMyHealth Patient Portal offered by [facility] by registering at the following website: [email or web address]. By joining [facility]s FollowMyHealth portal, you will also be able to view your health information using other applications (apps) compatible with our system.

## 2021-09-19 NOTE — PROGRESS NOTE ADULT - ASSESSMENT
35yo male w/ hx of obesity, YUE (on home cpap) presents with acute appendicitis  s/p lap appendectomy 9/18.   Medicine consult for co- management    Acute appendicitis  s/p Lap appendectomy 9/18  Advance diet per surgery  D/C planning per primary team    Obstructive sleep apnea, chronic  On CPAP at home, settings at home is automatic per pt  CPAP ordered  Cont pulse ox    Abnormal coagulation factors  Coag factors elevated with INR 1.61, unclear etiology  s/p vitamin K, FFP prior to OR 9/18  No fam hx of factor deficiency/hematologic disease per patient  ?possibly related to fatty liver disease  F/u AM coags- improved    Hepatic steatosis  - seen on CT, f/u lipid panel  - nutrition consult  - outpt. follow up with PCP    Hx of  anxiety  - on fluoxetine, continue    DVT ppx: per surgery, heparin  
36y Male s/p lap appy awaiting d/c this AM.

## 2021-09-19 NOTE — DISCHARGE NOTE PROVIDER - HOSPITAL COURSE
36-yo male with pmhx of pyloric stenosis, anxiety, & sleep apnea presents with c/o RLQ abdominal pain that began yesterday morning.  Pain is localized to the RLQ, with occasional radiation to the groin.  Denies any inciting factors, states that pain progressively worsened since onset until 0400 last night, when he presented for evaluation and treatment.  Reports 2 episodes of NB/NB vomiting associated since pain onset.  Denies fevers, chills, sweats.  Further denies CP/SOB/GONZALEZ/palpitations/dizziness/lightheadedness.  CT scan revealed acute appendicitis. Pt made NPO and scheduled for OR.    Hospital course:    Patient underwent successful lap appy without complications, was sent to PACU then to the floor for monitoring.      Pt given pain control. Diet was advanced appropriately until return of normal GI function was obtained as evidence by toleration of diet. Lab values were monitored.    Disposition: Patient to follow-up with Dr. Tucker as outpatient in 1-2 weeks.          
04-Feb-2018 20:45

## 2021-09-19 NOTE — DISCHARGE NOTE PROVIDER - CARE PROVIDER_API CALL
Epifanio Tucker  General Surgery  [address] [place] [address]  Phone: [phone]  Fax: [phone]  Follow Up Time:

## 2021-09-19 NOTE — DISCHARGE NOTE PROVIDER - NSDCCPCAREPLAN_GEN_ALL_CORE_FT
PRINCIPAL DISCHARGE DIAGNOSIS  Diagnosis: Appendicitis  Assessment and Plan of Treatment:       SECONDARY DISCHARGE DIAGNOSES  Diagnosis: Appendicitis  Assessment and Plan of Treatment:

## 2021-09-19 NOTE — DISCHARGE NOTE PROVIDER - NSDCFUADDINST_GEN_ALL_CORE_FT
Keep steri-strips clean, dry and intact x 24 hrs. Apply water proof ice pack 20 mins on, 20 mins off to help decrease pain and swelling. You may begin showering as usual but Do NOT remove steri-strips. Do not scrub or soak incision site. Pat dry. NO tub baths, NO swimming pools. No hot tubs.  Do not lift anything over 10 lbs.  Take frequent walks.  You may take over the counter stool softener such as colace as needed for constipation while taking pain medication.  Take over the counter Tylenol or Motrin/Ibuprofen as needed for  mild/moderate pain.  DO NOT DRIVE WHILE TAKING NARCOTIC PAIN MEDICATION.

## 2021-09-19 NOTE — PROGRESS NOTE ADULT - SUBJECTIVE AND OBJECTIVE BOX
STATUS POST: lap appy    POST OPERATIVE DAY #: 1    SUBJECTIVE:  Patient seen and examined at bedside.  No overnight events.  Patient with no new complaints at this time, tolerating reg diet.  Patient denies any fevers, chills, chest pain, shortness of breath, abdominal pain, nausea, vomiting or diarrhea.    Vital Signs Last 24 Hrs  T(C): 36.3 (19 Sep 2021 04:47), Max: 37.2 (18 Sep 2021 07:12)  T(F): 97.4 (19 Sep 2021 04:47), Max: 99 (18 Sep 2021 07:12)  HR: 85 (19 Sep 2021 04:47) (71 - 86)  BP: 109/63 (19 Sep 2021 04:47) (97/54 - 130/90)  BP(mean): 103 (18 Sep 2021 10:43) (103 - 103)  RR: 17 (19 Sep 2021 04:47) (17 - 18)  SpO2: 98% (19 Sep 2021 04:47) (93% - 98%)    PHYSICAL EXAM:  GENERAL: No acute distress, well-developed  HEAD:  Atraumatic, Normocephalic  ABDOMEN: Soft, minimally-tender, non-distended; bowel sounds+  NEUROLOGY: A&O x 3, no focal deficits    I&O's Summary    I&O's Detail    MEDICATIONS  (STANDING):  acetaminophen   Tablet .. 1000 milliGRAM(s) Oral every 6 hours  FLUoxetine 20 milliGRAM(s) Oral daily  heparin   Injectable 5000 Unit(s) SubCutaneous every 8 hours  influenza   Vaccine 0.5 milliLiter(s) IntraMuscular once  magnesium oxide 400 milliGRAM(s) Oral daily  multivitamin 1 Tablet(s) Oral daily  pantoprazole    Tablet 40 milliGRAM(s) Oral before breakfast    MEDICATIONS  (PRN):  ondansetron   Disintegrating Tablet 4 milliGRAM(s) Oral every 6 hours PRN Nausea and/or Vomiting  oxyCODONE    IR 5 milliGRAM(s) Oral every 4 hours PRN Moderate Pain (4 - 6)  oxyCODONE    IR 10 milliGRAM(s) Oral every 4 hours PRN Severe Pain (7 - 10)    LABS:                        16.5   11.92 )-----------( 225      ( 18 Sep 2021 05:12 )             46.8     09-18    141  |  106  |  11  ----------------------------<  103<H>  3.5   |  27  |  0.83    Ca    8.6      18 Sep 2021 05:12    TPro  7.5  /  Alb  3.3  /  TBili  1.1  /  DBili  x   /  AST  40<H>  /  ALT  72  /  AlkPhos  74  09-18    PT/INR - ( 18 Sep 2021 10:25 )   PT: 18.4 sec;   INR: 1.61 ratio         PTT - ( 18 Sep 2021 10:25 )  PTT:36.8 sec  Urinalysis Basic - ( 18 Sep 2021 06:22 )    Color: Yellow / Appearance: Slightly Turbid / S.010 / pH: x  Gluc: x / Ketone: Small  / Bili: Negative / Urobili: Negative   Blood: x / Protein: Negative / Nitrite: Negative   Leuk Esterase: Negative / RBC: 0-2 /HPF / WBC 0-2   Sq Epi: x / Non Sq Epi: Occasional / Bacteria: Moderate      RADIOLOGY & ADDITIONAL STUDIES:
Post Operative Note  Patient: SAMREEN SENA 36y (1985) Male   MRN: [id number]  Location: [address]  Visit: 09-18-21 Inpatient  Date: 09-18-21 @ 21:09    Procedure: S/P lap appy    Subjective:   Patient seen and examined at bedside.  No events post-operatively.  Patient with no new complaints at this time besides mild post operative pain, tolerating reg diet, denies flatus and bowel movement. Admits to voiding post-operatively.  Patient denies any fevers, chills, chest pain, shortness of breath,  nausea, vomiting or diarrhea.    Objective:  Vitals: T(F): 98.4 (09-18-21 @ 20:33), Max: 99 (09-18-21 @ 07:12)  HR: 86 (09-18-21 @ 20:33)  BP: 108/72 (09-18-21 @ 20:33) (97/54 - 130/90)  RR: 18 (09-18-21 @ 20:33)  SpO2: 96% (09-18-21 @ 20:33)  Vent Settings:     In:   IV Fluids: magnesium oxide 400 milliGRAM(s) Oral daily  multivitamin 1 Tablet(s) Oral daily      PHYSICAL EXAM:  GENERAL: No acute distress, well-developed  HEAD:  Atraumatic, Normocephalic  ABDOMEN: Soft, minimally-tender, non-distended; normal bowel sounds, incisions clean dry and intact.  NEUROLOGY: A&O x 3, no focal deficits    Medications: [Standing]  acetaminophen   Tablet .. 1000 milliGRAM(s) Oral every 6 hours  FLUoxetine 20 milliGRAM(s) Oral daily  heparin   Injectable 5000 Unit(s) SubCutaneous every 8 hours  influenza   Vaccine 0.5 milliLiter(s) IntraMuscular once  magnesium oxide 400 milliGRAM(s) Oral daily  multivitamin 1 Tablet(s) Oral daily  ondansetron   Disintegrating Tablet 4 milliGRAM(s) Oral every 6 hours PRN  oxyCODONE    IR 5 milliGRAM(s) Oral every 4 hours PRN  oxyCODONE    IR 10 milliGRAM(s) Oral every 4 hours PRN  pantoprazole    Tablet 40 milliGRAM(s) Oral before breakfast    Medications: [PRN]  acetaminophen   Tablet .. 1000 milliGRAM(s) Oral every 6 hours  FLUoxetine 20 milliGRAM(s) Oral daily  heparin   Injectable 5000 Unit(s) SubCutaneous every 8 hours  influenza   Vaccine 0.5 milliLiter(s) IntraMuscular once  magnesium oxide 400 milliGRAM(s) Oral daily  multivitamin 1 Tablet(s) Oral daily  ondansetron   Disintegrating Tablet 4 milliGRAM(s) Oral every 6 hours PRN  oxyCODONE    IR 5 milliGRAM(s) Oral every 4 hours PRN  oxyCODONE    IR 10 milliGRAM(s) Oral every 4 hours PRN  pantoprazole    Tablet 40 milliGRAM(s) Oral before breakfast    Labs:                        16.5   11.92 )-----------( 225      ( 18 Sep 2021 05:12 )             46.8     09-18    141  |  106  |  11  ----------------------------<  103<H>  3.5   |  27  |  0.83    Ca    8.6      18 Sep 2021 05:12    TPro  7.5  /  Alb  3.3  /  TBili  1.1  /  DBili  x   /  AST  40<H>  /  ALT  72  /  AlkPhos  74  09-18    PT/INR - ( 18 Sep 2021 10:25 )   PT: 18.4 sec;   INR: 1.61 ratio         PTT - ( 18 Sep 2021 10:25 )  PTT:36.8 sec      Imaging:  No post-op imaging studies    Assessment:  36yMale patient S/P lap appy    Plan:  - incentive spirometer  - pain control  - OOB  - REG diet  - serial abdominal exams  - labs in am    Surgical Team Contact Information  Spectralink: Ext: [phone] or [phone]  Pager: [phone]    Date/Time: 09-18-21 @ 21:09  
INTERVAL HPI/OVERNIGHT EVENTS:  Patient seen and examined at bedside. States he feels well. Patient is POD#1 s/p lap appendectomy. Patient states he is tolerating diet, has had a BM. States pain is minimal, just some soreness around surgical site. Patient eager to go home today.     MEDICATIONS  (STANDING):  acetaminophen   Tablet .. 1000 milliGRAM(s) Oral every 6 hours  FLUoxetine 20 milliGRAM(s) Oral daily  heparin   Injectable 5000 Unit(s) SubCutaneous every 8 hours  influenza   Vaccine 0.5 milliLiter(s) IntraMuscular once  magnesium oxide 400 milliGRAM(s) Oral daily  multivitamin 1 Tablet(s) Oral daily  pantoprazole    Tablet 40 milliGRAM(s) Oral before breakfast    MEDICATIONS  (PRN):  oxyCODONE    IR 5 milliGRAM(s) Oral every 4 hours PRN Moderate Pain (4 - 6)  oxyCODONE    IR 10 milliGRAM(s) Oral every 4 hours PRN Severe Pain (7 - 10)      Allergies    No Known Allergies    Intolerances      ROS:  CONSTITUTIONAL: No weakness, fevers or chills  EYES/ENT: No visual changes;  No vertigo or throat pain   NECK: No pain or stiffness  RESPIRATORY: No cough, wheezing, hemoptysis; No shortness of breath  CARDIOVASCULAR: No chest pain or palpitations  GASTROINTESTINAL: admits to mild soreness near surgical sites. No nausea, vomiting, or hematemesis  GENITOURINARY: No dysuria, frequency or hematuria  NEUROLOGICAL: No numbness or weakness  SKIN: No itching, burning, rashes, or lesions   All other review of systems is negative unless indicated above.    Vital Signs Last 24 Hrs  T(C): 36.3 (19 Sep 2021 04:47), Max: 36.9 (18 Sep 2021 20:33)  T(F): 97.4 (19 Sep 2021 04:47), Max: 98.4 (18 Sep 2021 20:33)  HR: 85 (19 Sep 2021 04:47) (71 - 86)  BP: 109/63 (19 Sep 2021 04:47) (97/54 - 109/63)  BP(mean): --  RR: 17 (19 Sep 2021 04:47) (17 - 18)  SpO2: 98% (19 Sep 2021 04:47) (93% - 98%)    - @ 07:01  -   @ 07:00  --------------------------------------------------------  IN: 0 mL / OUT: 450 mL / NET: -450 mL      Physical Exam:  General:  NAD  Neurology: A&Ox3, nonfocal, TYLER x 4  Respiratory: CTA B/L  CV: RRR, S1S2, no murmurs, rubs or gallops  Abdominal: Soft, NT, ND +BS, surgical sites with steri strips C/D/I  Extremities: No cyanosis/clubbing, + peripheral pulses      LABS:                        15.4   13.04 )-----------( 204      ( 19 Sep 2021 07:56 )             46.0         142  |  108  |  10  ----------------------------<  108<H>  3.8   |  27  |  0.75    Ca    8.3<L>      19 Sep 2021 07:56    TPro  x   /  Alb  x   /  TBili  0.9  /  DBili  x   /  AST  x   /  ALT  x   /  AlkPhos  x       PT/INR - ( 19 Sep 2021 07:56 )   PT: 13.1 sec;   INR: 1.13 ratio         PTT - ( 19 Sep 2021 07:56 )  PTT:31.2 sec  Urinalysis Basic - ( 18 Sep 2021 06:22 )    Color: Yellow / Appearance: Slightly Turbid / S.010 / pH: x  Gluc: x / Ketone: Small  / Bili: Negative / Urobili: Negative   Blood: x / Protein: Negative / Nitrite: Negative   Leuk Esterase: Negative / RBC: 0-2 /HPF / WBC 0-2   Sq Epi: x / Non Sq Epi: Occasional / Bacteria: Moderate        RADIOLOGY & ADDITIONAL TESTS:

## 2021-09-20 PROBLEM — Z87.19 PERSONAL HISTORY OF OTHER DISEASES OF THE DIGESTIVE SYSTEM: Chronic | Status: ACTIVE | Noted: 2021-09-18

## 2021-09-20 PROBLEM — Z00.00 ENCOUNTER FOR PREVENTIVE HEALTH EXAMINATION: Status: ACTIVE | Noted: 2021-09-20

## 2021-09-20 PROBLEM — F41.9 ANXIETY DISORDER, UNSPECIFIED: Chronic | Status: ACTIVE | Noted: 2021-09-18

## 2021-09-21 ENCOUNTER — NON-APPOINTMENT (OUTPATIENT)
Age: 36
End: 2021-09-21

## 2021-10-04 ENCOUNTER — APPOINTMENT (OUTPATIENT)
Dept: SURGERY | Facility: CLINIC | Age: 36
End: 2021-10-04
Payer: MEDICAID

## 2021-10-04 DIAGNOSIS — Z09 ENCOUNTER FOR FOLLOW-UP EXAMINATION AFTER COMPLETED TREATMENT FOR CONDITIONS OTHER THAN MALIGNANT NEOPLASM: ICD-10-CM

## 2021-10-04 PROCEDURE — 99024 POSTOP FOLLOW-UP VISIT: CPT

## 2021-10-04 NOTE — PHYSICAL EXAM
[Respiratory Effort] : normal respiratory effort [Alert] : alert [Oriented to Person] : oriented to person [Oriented to Place] : oriented to place [Oriented to Time] : oriented to time [Calm] : calm [de-identified] : NAD [de-identified] : EOM intact [de-identified] : Abdomen - soft, non-tender, obese.  There was slight fibrin and erythema along the edges of the umbilical incision.  The left aspect of the subcuticular stitch was also extruding from the wound and was therefore trimmed.  There was no dehiscence, exudate, or surrounding cellulitis.  The other two incisions were clean and healing well.

## 2021-12-24 NOTE — ED ADULT NURSE NOTE - NSFALLRSKOUTCOME_ED_ALL_ED
Renaldo Kulkarni  OTOLARYNGOLOGY  [address] [place] [address], [address]  [place] [address]  Phone: [phone]  Fax: [phone]  Follow Up Time:    Universal Safety Interventions

## 2023-01-30 ENCOUNTER — NON-APPOINTMENT (OUTPATIENT)
Age: 38
End: 2023-01-30

## 2023-03-09 ENCOUNTER — NON-APPOINTMENT (OUTPATIENT)
Age: 38
End: 2023-03-09

## 2023-09-09 ENCOUNTER — EMERGENCY (EMERGENCY)
Facility: HOSPITAL | Age: 38
LOS: 1 days | End: 2023-09-09
Attending: STUDENT IN AN ORGANIZED HEALTH CARE EDUCATION/TRAINING PROGRAM
Payer: MEDICAID

## 2023-09-09 VITALS
SYSTOLIC BLOOD PRESSURE: 129 MMHG | HEART RATE: 88 BPM | TEMPERATURE: 99 F | WEIGHT: 220.02 LBS | RESPIRATION RATE: 18 BRPM | HEIGHT: 68 IN | DIASTOLIC BLOOD PRESSURE: 93 MMHG | OXYGEN SATURATION: 100 %

## 2023-09-09 DIAGNOSIS — Z98.890 OTHER SPECIFIED POSTPROCEDURAL STATES: Chronic | ICD-10-CM

## 2023-09-09 LAB
ALBUMIN SERPL ELPH-MCNC: 3.7 G/DL — SIGNIFICANT CHANGE UP (ref 3.3–5)
ALP SERPL-CCNC: 101 U/L — SIGNIFICANT CHANGE UP (ref 40–120)
ALT FLD-CCNC: 292 U/L — HIGH (ref 12–78)
ANION GAP SERPL CALC-SCNC: 18 MMOL/L — HIGH (ref 5–17)
APPEARANCE UR: CLEAR — SIGNIFICANT CHANGE UP
AST SERPL-CCNC: 400 U/L — HIGH (ref 15–37)
BASOPHILS # BLD AUTO: 0.02 K/UL — SIGNIFICANT CHANGE UP (ref 0–0.2)
BASOPHILS NFR BLD AUTO: 0.3 % — SIGNIFICANT CHANGE UP (ref 0–2)
BILIRUB SERPL-MCNC: 2.1 MG/DL — HIGH (ref 0.2–1.2)
BILIRUB UR-MCNC: NEGATIVE — SIGNIFICANT CHANGE UP
BUN SERPL-MCNC: 9 MG/DL — SIGNIFICANT CHANGE UP (ref 7–23)
CALCIUM SERPL-MCNC: 9.6 MG/DL — SIGNIFICANT CHANGE UP (ref 8.5–10.1)
CHLORIDE SERPL-SCNC: 99 MMOL/L — SIGNIFICANT CHANGE UP (ref 96–108)
CO2 SERPL-SCNC: 23 MMOL/L — SIGNIFICANT CHANGE UP (ref 22–31)
COLOR SPEC: SIGNIFICANT CHANGE UP
CREAT SERPL-MCNC: 0.77 MG/DL — SIGNIFICANT CHANGE UP (ref 0.5–1.3)
DIFF PNL FLD: NEGATIVE — SIGNIFICANT CHANGE UP
EGFR: 118 ML/MIN/1.73M2 — SIGNIFICANT CHANGE UP
EOSINOPHIL # BLD AUTO: 0.02 K/UL — SIGNIFICANT CHANGE UP (ref 0–0.5)
EOSINOPHIL NFR BLD AUTO: 0.3 % — SIGNIFICANT CHANGE UP (ref 0–6)
GLUCOSE SERPL-MCNC: 83 MG/DL — SIGNIFICANT CHANGE UP (ref 70–99)
GLUCOSE UR QL: NEGATIVE MG/DL — SIGNIFICANT CHANGE UP
HCT VFR BLD CALC: 44.6 % — SIGNIFICANT CHANGE UP (ref 39–50)
HGB BLD-MCNC: 16.1 G/DL — SIGNIFICANT CHANGE UP (ref 13–17)
IMM GRANULOCYTES NFR BLD AUTO: 0.2 % — SIGNIFICANT CHANGE UP (ref 0–0.9)
KETONES UR-MCNC: >=160 MG/DL
LEUKOCYTE ESTERASE UR-ACNC: ABNORMAL
LIDOCAIN IGE QN: 330 U/L — HIGH (ref 13–75)
LYMPHOCYTES # BLD AUTO: 0.86 K/UL — LOW (ref 1–3.3)
LYMPHOCYTES # BLD AUTO: 14.1 % — SIGNIFICANT CHANGE UP (ref 13–44)
MCHC RBC-ENTMCNC: 34.7 PG — HIGH (ref 27–34)
MCHC RBC-ENTMCNC: 36.1 GM/DL — HIGH (ref 32–36)
MCV RBC AUTO: 96.1 FL — SIGNIFICANT CHANGE UP (ref 80–100)
MONOCYTES # BLD AUTO: 0.53 K/UL — SIGNIFICANT CHANGE UP (ref 0–0.9)
MONOCYTES NFR BLD AUTO: 8.7 % — SIGNIFICANT CHANGE UP (ref 2–14)
NEUTROPHILS # BLD AUTO: 4.64 K/UL — SIGNIFICANT CHANGE UP (ref 1.8–7.4)
NEUTROPHILS NFR BLD AUTO: 76.4 % — SIGNIFICANT CHANGE UP (ref 43–77)
NITRITE UR-MCNC: NEGATIVE — SIGNIFICANT CHANGE UP
NRBC # BLD: 0 /100 WBCS — SIGNIFICANT CHANGE UP (ref 0–0)
PH UR: 7.5 — SIGNIFICANT CHANGE UP (ref 5–8)
PLATELET # BLD AUTO: 151 K/UL — SIGNIFICANT CHANGE UP (ref 150–400)
POTASSIUM SERPL-MCNC: 3.6 MMOL/L — SIGNIFICANT CHANGE UP (ref 3.5–5.3)
POTASSIUM SERPL-SCNC: 3.6 MMOL/L — SIGNIFICANT CHANGE UP (ref 3.5–5.3)
PROT SERPL-MCNC: 7.9 G/DL — SIGNIFICANT CHANGE UP (ref 6–8.3)
PROT UR-MCNC: SIGNIFICANT CHANGE UP MG/DL
RBC # BLD: 4.64 M/UL — SIGNIFICANT CHANGE UP (ref 4.2–5.8)
RBC # FLD: 11.7 % — SIGNIFICANT CHANGE UP (ref 10.3–14.5)
SARS-COV-2 RNA SPEC QL NAA+PROBE: SIGNIFICANT CHANGE UP
SODIUM SERPL-SCNC: 140 MMOL/L — SIGNIFICANT CHANGE UP (ref 135–145)
SP GR SPEC: 1.02 — SIGNIFICANT CHANGE UP (ref 1–1.03)
UROBILINOGEN FLD QL: 1 MG/DL — SIGNIFICANT CHANGE UP (ref 0.2–1)
WBC # BLD: 6.08 K/UL — SIGNIFICANT CHANGE UP (ref 3.8–10.5)
WBC # FLD AUTO: 6.08 K/UL — SIGNIFICANT CHANGE UP (ref 3.8–10.5)

## 2023-09-09 PROCEDURE — 74177 CT ABD & PELVIS W/CONTRAST: CPT | Mod: MA

## 2023-09-09 PROCEDURE — 99285 EMERGENCY DEPT VISIT HI MDM: CPT

## 2023-09-09 PROCEDURE — 96374 THER/PROPH/DIAG INJ IV PUSH: CPT | Mod: XU

## 2023-09-09 PROCEDURE — 74177 CT ABD & PELVIS W/CONTRAST: CPT | Mod: 26,MA

## 2023-09-09 PROCEDURE — 96375 TX/PRO/DX INJ NEW DRUG ADDON: CPT

## 2023-09-09 PROCEDURE — 96376 TX/PRO/DX INJ SAME DRUG ADON: CPT

## 2023-09-09 PROCEDURE — 76705 ECHO EXAM OF ABDOMEN: CPT

## 2023-09-09 PROCEDURE — 83690 ASSAY OF LIPASE: CPT

## 2023-09-09 PROCEDURE — 36415 COLL VENOUS BLD VENIPUNCTURE: CPT

## 2023-09-09 PROCEDURE — 81001 URINALYSIS AUTO W/SCOPE: CPT

## 2023-09-09 PROCEDURE — 80053 COMPREHEN METABOLIC PANEL: CPT

## 2023-09-09 PROCEDURE — 87635 SARS-COV-2 COVID-19 AMP PRB: CPT

## 2023-09-09 PROCEDURE — 76705 ECHO EXAM OF ABDOMEN: CPT | Mod: 26

## 2023-09-09 PROCEDURE — 99284 EMERGENCY DEPT VISIT MOD MDM: CPT | Mod: 25

## 2023-09-09 PROCEDURE — 85025 COMPLETE CBC W/AUTO DIFF WBC: CPT

## 2023-09-09 RX ORDER — METOCLOPRAMIDE HCL 10 MG
10 TABLET ORAL ONCE
Refills: 0 | Status: COMPLETED | OUTPATIENT
Start: 2023-09-09 | End: 2023-09-09

## 2023-09-09 RX ORDER — FAMOTIDINE 10 MG/ML
20 INJECTION INTRAVENOUS ONCE
Refills: 0 | Status: COMPLETED | OUTPATIENT
Start: 2023-09-09 | End: 2023-09-09

## 2023-09-09 RX ORDER — IOHEXOL 300 MG/ML
30 INJECTION, SOLUTION INTRAVENOUS ONCE
Refills: 0 | Status: COMPLETED | OUTPATIENT
Start: 2023-09-09 | End: 2023-09-09

## 2023-09-09 RX ORDER — SODIUM CHLORIDE 9 MG/ML
1000 INJECTION INTRAMUSCULAR; INTRAVENOUS; SUBCUTANEOUS ONCE
Refills: 0 | Status: COMPLETED | OUTPATIENT
Start: 2023-09-09 | End: 2023-09-09

## 2023-09-09 RX ORDER — MORPHINE SULFATE 50 MG/1
4 CAPSULE, EXTENDED RELEASE ORAL ONCE
Refills: 0 | Status: DISCONTINUED | OUTPATIENT
Start: 2023-09-09 | End: 2023-09-09

## 2023-09-09 RX ORDER — ONDANSETRON 8 MG/1
4 TABLET, FILM COATED ORAL ONCE
Refills: 0 | Status: COMPLETED | OUTPATIENT
Start: 2023-09-09 | End: 2023-09-09

## 2023-09-09 RX ADMIN — Medication 10 MILLIGRAM(S): at 23:37

## 2023-09-09 RX ADMIN — ONDANSETRON 4 MILLIGRAM(S): 8 TABLET, FILM COATED ORAL at 19:15

## 2023-09-09 RX ADMIN — FAMOTIDINE 20 MILLIGRAM(S): 10 INJECTION INTRAVENOUS at 19:18

## 2023-09-09 RX ADMIN — ONDANSETRON 4 MILLIGRAM(S): 8 TABLET, FILM COATED ORAL at 21:10

## 2023-09-09 RX ADMIN — IOHEXOL 30 MILLILITER(S): 300 INJECTION, SOLUTION INTRAVENOUS at 19:59

## 2023-09-09 RX ADMIN — SODIUM CHLORIDE 1000 MILLILITER(S): 9 INJECTION INTRAMUSCULAR; INTRAVENOUS; SUBCUTANEOUS at 19:19

## 2023-09-09 RX ADMIN — MORPHINE SULFATE 4 MILLIGRAM(S): 50 CAPSULE, EXTENDED RELEASE ORAL at 19:15

## 2023-09-09 NOTE — ED PROVIDER NOTE - DIFFERENTIAL DIAGNOSIS
Differentials include but not limited to cholecystitis, biliary colic, gastritis, pancreatitis, enteritis, colitis, obstruction, diverticulitis Differential Diagnosis

## 2023-09-09 NOTE — ED PROVIDER NOTE - CLINICAL SUMMARY MEDICAL DECISION MAKING FREE TEXT BOX
Here with abdominal pain, nausea and vomiting in the setting of gastric sleeve.  Check labs, treat pain, get US/CT, re-eval.

## 2023-09-09 NOTE — ED ADULT NURSE NOTE - NSFALLUNIVINTERV_ED_ALL_ED
Bed/Stretcher in lowest position, wheels locked, appropriate side rails in place/Call bell, personal items and telephone in reach/Instruct patient to call for assistance before getting out of bed/chair/stretcher/Non-slip footwear applied when patient is off stretcher/[place] to call system/Physically safe environment - no spills, clutter or unnecessary equipment/Purposeful proactive rounding/Room/bathroom lighting operational, light cord in reach

## 2023-09-09 NOTE — ED PROVIDER NOTE - PROGRESS NOTE DETAILS
resting comfortably. pain and nausea improved. CT results pending. Dr. Maldonado will assume care resting comfortably. pain and nausea improved. CT results pending. Dr. Maldonado will continue care Patient with pancreatitis, comfortable after meds and able to tolerate p.o. after nausea medicine.  Discussed with patient admission versus discharge, he feels comfortable with discharge, will return if needed.  Patient offered narcotic pain medications, he declines at this time.

## 2023-09-09 NOTE — ED PROVIDER NOTE - CARE PROVIDER_API CALL
Luis Enrique Holloway  Gastroenterology  [address] [place] [address]  Phone: [phone]  Fax: [phone]  Follow Up Time: 7-10 Days

## 2023-09-09 NOTE — ED PROVIDER NOTE - NSFOLLOWUPINSTRUCTIONS_ED_ALL_ED_FT
Please follow up with your Primary Care Physician and any specialists as discussed.  Please take your medications as prescribed and or instructed.    You can take acetaminophen (Tylenol) for your pain. It is available over the counter. You can take up to 1 gram (three 325mg tablets or two 500mg tablets) every 4-6 hours as needed.    If your symptoms persist or worsen, please seek care. Either return to the Emergency Department, go to urgent care or see your primary care doctor.  Please refer to general information and instructions attached or below:     Pancreatitis    WHAT YOU NEED TO KNOW:    What is pancreatitis? Pancreatitis is inflammation of your pancreas. The pancreas is an organ that makes insulin. It also makes enzymes (digestive juices) that help your body digest food. Pancreatitis may be an acute (short-term) problem that happens only once. It may become a chronic (long-term) problem that comes and goes over time.Pancreas         What causes pancreatitis? Pancreatitis is usually caused by alcohol or gallstones. Less common causes are certain medicines, an injury to the abdomen, some procedures, and infections. High levels of triglycerides (fats) and calcium may also cause pancreatitis.    What are the signs and symptoms of pancreatitis?     Severe burning, stabbing, or aching pain that starts in the top of your abdomen and spreads to your back      Fever      Nausea and vomiting      Abdomen that is tender to the touch      Weight loss without trying    How is pancreatitis diagnosed? Your healthcare provider will examine you and ask about your symptoms. You may need any of the following:     Blood tests may show infection, pancreas function, or provide information about your overall health.      An x-ray, ultrasound, or CT may show the cause of your pancreatitis and help healthcare providers plan your treatment. You may be given contrast liquid to help your pancreas show up better in the pictures. Tell the healthcare provider if you have ever had an allergic reaction to contrast liquid.       Endoscopic retrograde cholangiopancreatography (ERCP) is a procedure used to find stones, tumors, or narrowed bile ducts. A long tube with a camera on the end is passed down your throat and into your stomach and abdomen.    How is pancreatitis treated? Treatment depends on the cause of your pancreatitis. You may need to stay in the hospital for treatment and more tests.    Medicines:   Prescription pain medicine may be given. Ask your healthcare provider how to take this medicine safely. Some prescription pain medicines contain acetaminophen. Do not take other medicines that contain acetaminophen without talking to your healthcare provider. Too much acetaminophen may cause liver damage. Prescription pain medicine may cause constipation. Ask your healthcare provider how to prevent or treat constipation.       Antibiotics may be given to treat a bacterial infection.      Surgery may be needed to open or widen blocked bile ducts or drain fluid from your pancreas. Your gallbladder may need to be removed if gallstones are causing your pancreatitis.    How can I manage my symptoms?     Rest when you feel it is needed. Slowly start to do more each day. Return to your usual activities as directed.      Do not drink any alcohol. If you need help to stop drinking, contact the following organization:     Alcoholics Anonymous  Web Address: http://www.aa.org          Ask your healthcare provider or dietitian about the best foods to eat. You may need to eat foods that are low in fat if you have chronic pancreatitis.      Do not smoke. Nicotine and other chemicals in cigarettes and cigars can cause damage. Ask your healthcare provider for information if you currently smoke and need help to quit. E-cigarettes or smokeless tobacco still contain nicotine. Talk to your healthcare provider before you use these products.     When should I call my doctor?     You have severe pain in your abdomen and you are vomiting.      You have a fever.       You continue to lose weight without trying.      Your skin or the whites of your eyes turn yellow.      You have questions or concerns about your condition or care.    CARE AGREEMENT:    You have the right to help plan your care. Learn about your health condition and how it may be treated. Discuss treatment options with your healthcare providers to decide what care you want to receive. You always have the right to refuse treatment.

## 2023-09-09 NOTE — ED PROVIDER NOTE - OBJECTIVE STATEMENT
38-year-old male presents with nausea, vomiting, abdominal pain for the past week.  Patient reports nausea and vomiting started 1 week ago, has been intermittent in nature.  Seem to be improving but then worsened today.  Has vomited twice today.  Also reports some mild constipation the past week.  Had a bowel movement today but states it was less than usual and hard.  Patient reports some mild intermittent abdominal pain the past few days, much worse today.  Pain currently moderate in nature.  Pain seems to be worse to upper abdomen, more so right upper quadrant.  Patient has been belching today.  Denies chest pain or shortness of breath.  Patient had gastric sleeve in October 2021 by Dr. Orellana.  Also history of appendectomy.

## 2023-09-09 NOTE — ED PROVIDER NOTE - PATIENT PORTAL LINK FT
You can access the FollowMyHealth Patient Portal offered by [facility] by registering at the following website: [email or web address]. By joining [employer]’s FollowMyHealth portal, you will also be able to view your health information using other applications (apps) compatible with our system.

## 2023-09-09 NOTE — ED PROVIDER NOTE - ATTENDING APP SHARED VISIT CONTRIBUTION OF CARE
This was a shared visit with CRISPIN. I reviewed and verified the documentation and independently performed the documented MDM.

## 2023-09-09 NOTE — ED ADULT NURSE NOTE - OBJECTIVE STATEMENT
patient comes in with complaints of nausea/vomiting x 1 week. patient states it is worse today. hx of gastric sleeve surgery 10/2021 without complications. patient states that he has been having constipation/difficult BMs. Abdomen tender RUQ with palpation. patient states that he has been belching with increasing over the last day or so.

## 2023-09-09 NOTE — ED ADULT TRIAGE NOTE - CHIEF COMPLAINT QUOTE
Pt ambulatory to triage c/o nausea/vomiting x 1 week.   Pt states it appears to improve but then worsening today.   Pt with hx of gastric sleeve surgery 10/2021 without complications.   Pt reports constipation/difficult BMs.   Abd mildly tender except for increased pain to RUQ with palpation.   Pt belching during triage and states this has increased over the last day or more. BN

## 2023-09-09 NOTE — ED ADULT NURSE REASSESSMENT NOTE - NS ED NURSE REASSESS COMMENT FT1
2330: Pt received from previous RN. Alert and oriented x4. No acute discomfort noted at this time. Pt pending provider re evaluation and plan for dispo. Respirations even and unlabored. Pt provided with warm blanket. Safety and comfort maintained at all times. Will continue to monitor.

## 2023-09-10 VITALS
TEMPERATURE: 98 F | DIASTOLIC BLOOD PRESSURE: 83 MMHG | OXYGEN SATURATION: 99 % | SYSTOLIC BLOOD PRESSURE: 122 MMHG | RESPIRATION RATE: 18 BRPM | HEART RATE: 79 BPM

## 2023-09-10 RX ORDER — ONDANSETRON 8 MG/1
1 TABLET, FILM COATED ORAL
Qty: 15 | Refills: 0
Start: 2023-09-10 | End: 2023-09-14

## 2023-10-22 ENCOUNTER — EMERGENCY (EMERGENCY)
Facility: HOSPITAL | Age: 38
LOS: 1 days | Discharge: HOME | End: 2023-10-22
Attending: STUDENT IN AN ORGANIZED HEALTH CARE EDUCATION/TRAINING PROGRAM | Admitting: STUDENT IN AN ORGANIZED HEALTH CARE EDUCATION/TRAINING PROGRAM
Payer: MEDICAID

## 2023-10-22 VITALS
HEART RATE: 101 BPM | SYSTOLIC BLOOD PRESSURE: 108 MMHG | TEMPERATURE: 99 F | HEIGHT: 68 IN | OXYGEN SATURATION: 96 % | WEIGHT: 214.95 LBS | RESPIRATION RATE: 18 BRPM | DIASTOLIC BLOOD PRESSURE: 63 MMHG

## 2023-10-22 VITALS — HEART RATE: 95 BPM

## 2023-10-22 DIAGNOSIS — Z98.890 OTHER SPECIFIED POSTPROCEDURAL STATES: Chronic | ICD-10-CM

## 2023-10-22 LAB
ALBUMIN SERPL ELPH-MCNC: 2.7 G/DL — LOW (ref 3.3–5)
ALBUMIN SERPL ELPH-MCNC: 2.7 G/DL — LOW (ref 3.3–5)
ALP SERPL-CCNC: 64 U/L — SIGNIFICANT CHANGE UP (ref 40–120)
ALP SERPL-CCNC: 64 U/L — SIGNIFICANT CHANGE UP (ref 40–120)
ALT FLD-CCNC: 55 U/L — SIGNIFICANT CHANGE UP (ref 12–78)
ALT FLD-CCNC: 55 U/L — SIGNIFICANT CHANGE UP (ref 12–78)
ANION GAP SERPL CALC-SCNC: 15 MMOL/L — SIGNIFICANT CHANGE UP (ref 5–17)
ANION GAP SERPL CALC-SCNC: 15 MMOL/L — SIGNIFICANT CHANGE UP (ref 5–17)
APPEARANCE UR: CLEAR — SIGNIFICANT CHANGE UP
APPEARANCE UR: CLEAR — SIGNIFICANT CHANGE UP
AST SERPL-CCNC: 42 U/L — HIGH (ref 15–37)
AST SERPL-CCNC: 42 U/L — HIGH (ref 15–37)
BASOPHILS # BLD AUTO: 0.03 K/UL — SIGNIFICANT CHANGE UP (ref 0–0.2)
BASOPHILS # BLD AUTO: 0.03 K/UL — SIGNIFICANT CHANGE UP (ref 0–0.2)
BASOPHILS NFR BLD AUTO: 0.3 % — SIGNIFICANT CHANGE UP (ref 0–2)
BASOPHILS NFR BLD AUTO: 0.3 % — SIGNIFICANT CHANGE UP (ref 0–2)
BILIRUB SERPL-MCNC: 2.1 MG/DL — HIGH (ref 0.2–1.2)
BILIRUB SERPL-MCNC: 2.1 MG/DL — HIGH (ref 0.2–1.2)
BILIRUB UR-MCNC: NEGATIVE — SIGNIFICANT CHANGE UP
BILIRUB UR-MCNC: NEGATIVE — SIGNIFICANT CHANGE UP
BUN SERPL-MCNC: 13 MG/DL — SIGNIFICANT CHANGE UP (ref 7–23)
BUN SERPL-MCNC: 13 MG/DL — SIGNIFICANT CHANGE UP (ref 7–23)
CALCIUM SERPL-MCNC: 8.4 MG/DL — LOW (ref 8.5–10.1)
CALCIUM SERPL-MCNC: 8.4 MG/DL — LOW (ref 8.5–10.1)
CHLORIDE SERPL-SCNC: 103 MMOL/L — SIGNIFICANT CHANGE UP (ref 96–108)
CHLORIDE SERPL-SCNC: 103 MMOL/L — SIGNIFICANT CHANGE UP (ref 96–108)
CO2 SERPL-SCNC: 21 MMOL/L — LOW (ref 22–31)
CO2 SERPL-SCNC: 21 MMOL/L — LOW (ref 22–31)
COLOR SPEC: YELLOW — SIGNIFICANT CHANGE UP
COLOR SPEC: YELLOW — SIGNIFICANT CHANGE UP
CREAT SERPL-MCNC: 0.73 MG/DL — SIGNIFICANT CHANGE UP (ref 0.5–1.3)
CREAT SERPL-MCNC: 0.73 MG/DL — SIGNIFICANT CHANGE UP (ref 0.5–1.3)
DIFF PNL FLD: NEGATIVE — SIGNIFICANT CHANGE UP
DIFF PNL FLD: NEGATIVE — SIGNIFICANT CHANGE UP
EGFR: 119 ML/MIN/1.73M2 — SIGNIFICANT CHANGE UP
EGFR: 119 ML/MIN/1.73M2 — SIGNIFICANT CHANGE UP
EOSINOPHIL # BLD AUTO: 0.08 K/UL — SIGNIFICANT CHANGE UP (ref 0–0.5)
EOSINOPHIL # BLD AUTO: 0.08 K/UL — SIGNIFICANT CHANGE UP (ref 0–0.5)
EOSINOPHIL NFR BLD AUTO: 0.9 % — SIGNIFICANT CHANGE UP (ref 0–6)
EOSINOPHIL NFR BLD AUTO: 0.9 % — SIGNIFICANT CHANGE UP (ref 0–6)
GLUCOSE SERPL-MCNC: 83 MG/DL — SIGNIFICANT CHANGE UP (ref 70–99)
GLUCOSE SERPL-MCNC: 83 MG/DL — SIGNIFICANT CHANGE UP (ref 70–99)
GLUCOSE UR QL: NEGATIVE MG/DL — SIGNIFICANT CHANGE UP
GLUCOSE UR QL: NEGATIVE MG/DL — SIGNIFICANT CHANGE UP
HCT VFR BLD CALC: 50.3 % — HIGH (ref 39–50)
HCT VFR BLD CALC: 50.3 % — HIGH (ref 39–50)
HGB BLD-MCNC: 17.7 G/DL — HIGH (ref 13–17)
HGB BLD-MCNC: 17.7 G/DL — HIGH (ref 13–17)
IMM GRANULOCYTES NFR BLD AUTO: 0.3 % — SIGNIFICANT CHANGE UP (ref 0–0.9)
IMM GRANULOCYTES NFR BLD AUTO: 0.3 % — SIGNIFICANT CHANGE UP (ref 0–0.9)
KETONES UR-MCNC: 80 MG/DL
KETONES UR-MCNC: 80 MG/DL
LACTATE SERPL-SCNC: 1 MMOL/L — SIGNIFICANT CHANGE UP (ref 0.7–2)
LACTATE SERPL-SCNC: 1 MMOL/L — SIGNIFICANT CHANGE UP (ref 0.7–2)
LEUKOCYTE ESTERASE UR-ACNC: NEGATIVE — SIGNIFICANT CHANGE UP
LEUKOCYTE ESTERASE UR-ACNC: NEGATIVE — SIGNIFICANT CHANGE UP
LIDOCAIN IGE QN: 57 U/L — SIGNIFICANT CHANGE UP (ref 13–75)
LIDOCAIN IGE QN: 57 U/L — SIGNIFICANT CHANGE UP (ref 13–75)
LYMPHOCYTES # BLD AUTO: 1.64 K/UL — SIGNIFICANT CHANGE UP (ref 1–3.3)
LYMPHOCYTES # BLD AUTO: 1.64 K/UL — SIGNIFICANT CHANGE UP (ref 1–3.3)
LYMPHOCYTES # BLD AUTO: 18.9 % — SIGNIFICANT CHANGE UP (ref 13–44)
LYMPHOCYTES # BLD AUTO: 18.9 % — SIGNIFICANT CHANGE UP (ref 13–44)
MCHC RBC-ENTMCNC: 35 PG — HIGH (ref 27–34)
MCHC RBC-ENTMCNC: 35 PG — HIGH (ref 27–34)
MCHC RBC-ENTMCNC: 35.2 GM/DL — SIGNIFICANT CHANGE UP (ref 32–36)
MCHC RBC-ENTMCNC: 35.2 GM/DL — SIGNIFICANT CHANGE UP (ref 32–36)
MCV RBC AUTO: 99.4 FL — SIGNIFICANT CHANGE UP (ref 80–100)
MCV RBC AUTO: 99.4 FL — SIGNIFICANT CHANGE UP (ref 80–100)
MONOCYTES # BLD AUTO: 0.66 K/UL — SIGNIFICANT CHANGE UP (ref 0–0.9)
MONOCYTES # BLD AUTO: 0.66 K/UL — SIGNIFICANT CHANGE UP (ref 0–0.9)
MONOCYTES NFR BLD AUTO: 7.6 % — SIGNIFICANT CHANGE UP (ref 2–14)
MONOCYTES NFR BLD AUTO: 7.6 % — SIGNIFICANT CHANGE UP (ref 2–14)
NEUTROPHILS # BLD AUTO: 6.26 K/UL — SIGNIFICANT CHANGE UP (ref 1.8–7.4)
NEUTROPHILS # BLD AUTO: 6.26 K/UL — SIGNIFICANT CHANGE UP (ref 1.8–7.4)
NEUTROPHILS NFR BLD AUTO: 72 % — SIGNIFICANT CHANGE UP (ref 43–77)
NEUTROPHILS NFR BLD AUTO: 72 % — SIGNIFICANT CHANGE UP (ref 43–77)
NITRITE UR-MCNC: NEGATIVE — SIGNIFICANT CHANGE UP
NITRITE UR-MCNC: NEGATIVE — SIGNIFICANT CHANGE UP
NRBC # BLD: 0 /100 WBCS — SIGNIFICANT CHANGE UP (ref 0–0)
NRBC # BLD: 0 /100 WBCS — SIGNIFICANT CHANGE UP (ref 0–0)
PH UR: 6 — SIGNIFICANT CHANGE UP (ref 5–8)
PH UR: 6 — SIGNIFICANT CHANGE UP (ref 5–8)
PLATELET # BLD AUTO: 234 K/UL — SIGNIFICANT CHANGE UP (ref 150–400)
PLATELET # BLD AUTO: 234 K/UL — SIGNIFICANT CHANGE UP (ref 150–400)
POTASSIUM SERPL-MCNC: 3.5 MMOL/L — SIGNIFICANT CHANGE UP (ref 3.5–5.3)
POTASSIUM SERPL-MCNC: 3.5 MMOL/L — SIGNIFICANT CHANGE UP (ref 3.5–5.3)
POTASSIUM SERPL-SCNC: 3.5 MMOL/L — SIGNIFICANT CHANGE UP (ref 3.5–5.3)
POTASSIUM SERPL-SCNC: 3.5 MMOL/L — SIGNIFICANT CHANGE UP (ref 3.5–5.3)
PROT SERPL-MCNC: 6.6 G/DL — SIGNIFICANT CHANGE UP (ref 6–8.3)
PROT SERPL-MCNC: 6.6 G/DL — SIGNIFICANT CHANGE UP (ref 6–8.3)
PROT UR-MCNC: NEGATIVE MG/DL — SIGNIFICANT CHANGE UP
PROT UR-MCNC: NEGATIVE MG/DL — SIGNIFICANT CHANGE UP
RBC # BLD: 5.06 M/UL — SIGNIFICANT CHANGE UP (ref 4.2–5.8)
RBC # BLD: 5.06 M/UL — SIGNIFICANT CHANGE UP (ref 4.2–5.8)
RBC # FLD: 14 % — SIGNIFICANT CHANGE UP (ref 10.3–14.5)
RBC # FLD: 14 % — SIGNIFICANT CHANGE UP (ref 10.3–14.5)
SODIUM SERPL-SCNC: 139 MMOL/L — SIGNIFICANT CHANGE UP (ref 135–145)
SODIUM SERPL-SCNC: 139 MMOL/L — SIGNIFICANT CHANGE UP (ref 135–145)
SP GR SPEC: 1.06 — HIGH (ref 1–1.03)
SP GR SPEC: 1.06 — HIGH (ref 1–1.03)
UROBILINOGEN FLD QL: 1 MG/DL — SIGNIFICANT CHANGE UP (ref 0.2–1)
UROBILINOGEN FLD QL: 1 MG/DL — SIGNIFICANT CHANGE UP (ref 0.2–1)
WBC # BLD: 8.7 K/UL — SIGNIFICANT CHANGE UP (ref 3.8–10.5)
WBC # BLD: 8.7 K/UL — SIGNIFICANT CHANGE UP (ref 3.8–10.5)
WBC # FLD AUTO: 8.7 K/UL — SIGNIFICANT CHANGE UP (ref 3.8–10.5)
WBC # FLD AUTO: 8.7 K/UL — SIGNIFICANT CHANGE UP (ref 3.8–10.5)

## 2023-10-22 PROCEDURE — 81003 URINALYSIS AUTO W/O SCOPE: CPT

## 2023-10-22 PROCEDURE — 74177 CT ABD & PELVIS W/CONTRAST: CPT | Mod: 26,MA

## 2023-10-22 PROCEDURE — 99285 EMERGENCY DEPT VISIT HI MDM: CPT | Mod: 25

## 2023-10-22 PROCEDURE — 93010 ELECTROCARDIOGRAM REPORT: CPT

## 2023-10-22 PROCEDURE — 99285 EMERGENCY DEPT VISIT HI MDM: CPT

## 2023-10-22 PROCEDURE — 76705 ECHO EXAM OF ABDOMEN: CPT

## 2023-10-22 PROCEDURE — 96375 TX/PRO/DX INJ NEW DRUG ADDON: CPT

## 2023-10-22 PROCEDURE — 71250 CT THORAX DX C-: CPT | Mod: 26,MA

## 2023-10-22 PROCEDURE — 83605 ASSAY OF LACTIC ACID: CPT

## 2023-10-22 PROCEDURE — 87086 URINE CULTURE/COLONY COUNT: CPT

## 2023-10-22 PROCEDURE — 76705 ECHO EXAM OF ABDOMEN: CPT | Mod: 26

## 2023-10-22 PROCEDURE — 85025 COMPLETE CBC W/AUTO DIFF WBC: CPT

## 2023-10-22 PROCEDURE — 80053 COMPREHEN METABOLIC PANEL: CPT

## 2023-10-22 PROCEDURE — 71250 CT THORAX DX C-: CPT | Mod: MA

## 2023-10-22 PROCEDURE — 83690 ASSAY OF LIPASE: CPT

## 2023-10-22 PROCEDURE — 96374 THER/PROPH/DIAG INJ IV PUSH: CPT | Mod: XU

## 2023-10-22 PROCEDURE — 93005 ELECTROCARDIOGRAM TRACING: CPT

## 2023-10-22 PROCEDURE — 71046 X-RAY EXAM CHEST 2 VIEWS: CPT

## 2023-10-22 PROCEDURE — 71046 X-RAY EXAM CHEST 2 VIEWS: CPT | Mod: 26

## 2023-10-22 PROCEDURE — 36415 COLL VENOUS BLD VENIPUNCTURE: CPT

## 2023-10-22 PROCEDURE — 74177 CT ABD & PELVIS W/CONTRAST: CPT | Mod: MA

## 2023-10-22 RX ORDER — ACETAMINOPHEN 500 MG
1000 TABLET ORAL ONCE
Refills: 0 | Status: COMPLETED | OUTPATIENT
Start: 2023-10-22 | End: 2023-10-22

## 2023-10-22 RX ORDER — ONDANSETRON 8 MG/1
1 TABLET, FILM COATED ORAL
Qty: 9 | Refills: 0
Start: 2023-10-22 | End: 2023-10-24

## 2023-10-22 RX ORDER — SODIUM CHLORIDE 9 MG/ML
1000 INJECTION INTRAMUSCULAR; INTRAVENOUS; SUBCUTANEOUS ONCE
Refills: 0 | Status: COMPLETED | OUTPATIENT
Start: 2023-10-22 | End: 2023-10-22

## 2023-10-22 RX ORDER — LEVOFLOXACIN 5 MG/ML
1 INJECTION, SOLUTION INTRAVENOUS
Qty: 5 | Refills: 0
Start: 2023-10-22 | End: 2023-10-26

## 2023-10-22 RX ORDER — ONDANSETRON 8 MG/1
4 TABLET, FILM COATED ORAL ONCE
Refills: 0 | Status: DISCONTINUED | OUTPATIENT
Start: 2023-10-22 | End: 2023-10-22

## 2023-10-22 RX ORDER — METOCLOPRAMIDE HCL 10 MG
10 TABLET ORAL ONCE
Refills: 0 | Status: COMPLETED | OUTPATIENT
Start: 2023-10-22 | End: 2023-10-22

## 2023-10-22 RX ORDER — FAMOTIDINE 10 MG/ML
20 INJECTION INTRAVENOUS ONCE
Refills: 0 | Status: COMPLETED | OUTPATIENT
Start: 2023-10-22 | End: 2023-10-22

## 2023-10-22 RX ADMIN — FAMOTIDINE 20 MILLIGRAM(S): 10 INJECTION INTRAVENOUS at 12:51

## 2023-10-22 RX ADMIN — Medication 400 MILLIGRAM(S): at 12:52

## 2023-10-22 RX ADMIN — SODIUM CHLORIDE 1000 MILLILITER(S): 9 INJECTION INTRAMUSCULAR; INTRAVENOUS; SUBCUTANEOUS at 11:09

## 2023-10-22 RX ADMIN — Medication 10 MILLIGRAM(S): at 11:09

## 2023-10-22 NOTE — ED PROVIDER NOTE - OBJECTIVE STATEMENT
38-year-old male, history of gastric sleeve surgery (2 years ago), pancreatitis, appendectomy, pyloric stenosis surgery (as a child), presents for evaluation of N/V/D x5 days.  Gradual onset of NBNB vomiting approximately 5–10 times per day with associated nonbloody diarrhea x5 times per day.  Denies any associated chills, fever, urinary symptoms.  Associated with generalized abdominal pain.  Symptoms worsen after food intake.  A few weeks ago was admitted for pancreatitis and since then "I stopped drinking alcohol".  No other complaints.

## 2023-10-22 NOTE — ED ADULT NURSE NOTE - OBJECTIVE STATEMENT
Pt states she has been having N/V/D and generalized abd pain since Wed. Pt denies fever, chills,  s/s, and bloody stools. Pt states he has a history of a Gastric Sleeve on Oct 6th 2021. Pt appears uncomfortable. Pt in no acute distress.

## 2023-10-22 NOTE — ED PROVIDER NOTE - ATTENDING APP SHARED VISIT CONTRIBUTION OF CARE
Lamonte Cai MD (Attending Physician):    I performed a history and physical exam of the patient and discussed their management with the CRISPIN. I have reviewed the CRISPIN note and agree with the documented findings and plan of care, except as noted. This was a shared visit with an CRISPIN. I reviewed and verified the documentation and independently performed my own history/exam/medical decision making. My medical decision making and observations are found below. Please refer to any progress notes for updates on clinical course.    HPI:  38-year-old male, history of gastric sleeve surgery (2 years ago), pancreatitis, appendectomy, pyloric stenosis surgery (as a child), presents for evaluation of N/V/D x5 days.  Gradual onset of NBNB vomiting approximately 5–10 times per day with associated nonbloody diarrhea x5 times per day.  Denies any associated chills, fever, urinary symptoms.  Associated with generalized abdominal pain.  Symptoms worsen after food intake.  A few weeks ago was admitted for pancreatitis and since then "I stopped drinking alcohol".  No other complaints.    PE:  GEN - NAD, well appearing, A&Ox3  HEAD - NC/AT  EYES - PERRL, EOMI  ENT - Airway patent, mucous membranes dry  PULMONARY - CTA b/l, symmetric breath sounds, no W/R/R  CARDIAC - +S1S2, RRR, no M/G/R, no JVD  ABDOMEN - +BS, ND, +mildly TTP diffusely, soft, no guarding, no rebound, no masses, no rigidity   - No CVA TTP b/l  EXTREMITIES - FROM, symmetric pulses, no edema  SKIN - No rash or bruising  NEUROLOGIC - Alert, speech clear, no focal deficits  PSYCH - Normal mood/affect, normal insight    MDM:  DDx includes, but not limited to: pancreatitis, cholecystitis, gastroenteritis, diverticulitis, SBO, UTI, kidney stone. ekg, cxr, RUQ US, CT a/p, labs, urine, reglan, pepid, tylenol, IVF, possible surgery consult. Dispo pending w/u.

## 2023-10-22 NOTE — ED PROVIDER NOTE - NSFOLLOWUPINSTRUCTIONS_ED_ALL_ED_FT
Follow up with the primary care doctor within 2-3 days.  Follow up with the gastroenterologist within 1 week.  If you experience any new or worsening symptoms or if you are concerned you can always come back to the emergency for a re-evaluation.  Some results may not be available at the time of your discharge from the hospital. You can download the FOLLOW MY HEALTH christi and have access to these results.  If there were any prescriptions given to you during the visit today take them as prescribed. If you have any questions you can ask the pharmacist.

## 2023-10-22 NOTE — ED PROVIDER NOTE - CARE PLAN
1 Principal Discharge DX:	Pneumonia, aspiration  Secondary Diagnosis:	Nausea vomiting and diarrhea

## 2023-10-22 NOTE — ED PROVIDER NOTE - CLINICAL SUMMARY MEDICAL DECISION MAKING FREE TEXT BOX
38-year-old male, presents for evaluation of N/V/D with abdominal pain x5 days.  Nontoxic-appearing, tachycardic at 101 bpm, afebrile.  Abdomen soft, nondistended with generalized mild tenderness.  No CVAT.  Given patient's history of gastric sleeve and presenting symptoms patient will need CT to assess for colitis versus diverticulitis versus gastric sleeve complications.  Low clinical suspicion for pyelonephritis or renal colic.  We will do labs, urine, IV fluids, Reglan and reassess. 38-year-old male, presents for evaluation of N/V/D with abdominal pain x5 days.  Nontoxic-appearing, tachycardic at 101 bpm, afebrile.  Abdomen soft, nondistended with generalized mild tenderness.  No CVAT.  Given patient's history of gastric sleeve and presenting symptoms patient will need CT to assess for colitis versus diverticulitis versus gastric sleeve complications.  Low clinical suspicion for pyelonephritis or renal colic.  We will do labs, urine, IV fluids, Reglan and reassess.    17: 05–patient feels much better, tolerating p.o. intake.  All test reviewed.  Discussed with the surgery PA.  No indication for surgical intervention at this time.  CT chest shows a possible aspiration right lower lobe.  On reexam patient did say that at some point choked on his vomiting once.  Denies any shortness of breath or chest pain at this time.  Based on patient's criteria and tolerance to medication we will give the Levaquin p.o. for 5 days and outpatient PMD follow-up in 1 to 2 days.  And will give also a GI follow-up within 1 week.  Discussed red flags to come back to the hospital.

## 2023-10-22 NOTE — ED ADULT TRIAGE NOTE - CHIEF COMPLAINT QUOTE
Pt ambulatory to triage c/o n/v/d since Wednesday.  Pt reports mid abd discomfort intermittently since onset of symptoms, denies fevers at home.  Pt states he was recently admitted to ED for pancreatitis, also with hx of gastric sleeve sx in 2021.   Pt appears pale.   BN

## 2023-10-22 NOTE — ED PROVIDER NOTE - PATIENT PORTAL LINK FT
You can access the FollowMyHealth Patient Portal offered by [facility] by registering at the following website: [email or web address]. By joining [employer]’s FollowMyHealth portal, you will also be able to view your health information using other applications (apps) compatible with our system.

## 2023-10-22 NOTE — ED ADULT NURSE NOTE - NSFALLUNIVINTERV_ED_ALL_ED
Bed/Stretcher in lowest position, wheels locked, appropriate side rails in place/Call bell, personal items and telephone in reach/Instruct patient to call for assistance before getting out of bed/chair/stretcher/Non-slip footwear applied when patient is off stretcher/[place] to call system/Physically safe environment - no spills, clutter or unnecessary equipment/Purposeful proactive rounding/Room/bathroom lighting operational, light cord in reach

## 2023-10-22 NOTE — CONSULT NOTE ADULT - SUBJECTIVE AND OBJECTIVE BOX
INCOMPLETE     SURGERY PA CONSULT NOTE:    CHIEF COMPLAINT:  Patient is a 38y old male who presents with a chief complaint of abdominal pain, nausea, and vomiting x 4 days    HPI:  This is a pleasant, 38-yo male with history of anxiety who presents to the ED today with complaints of upper abdominal pain, NBNB vomiting, and non-bloody diarrhea for the past 4 days.  Reports that abdominal pain and vomiting started a few hours after eating breakfast last Wednesday, followed by diarrhea about an hour later after which the abdominal pain subsided.  He finished work, went home, and had recurrence of similar symptom pattern for the next 2 days.  On Friday, he reports that the pain and the frequency of diarrhea worsened - pain is now constant, and has had 11-13 bouts of diarrhea on Friday and yesterday.  Has also noticed that his urine has been somewhat darker than usual since symptoms began - denies hematuria or other urinary complaints.  Denies any recent meals of undercooked or raw foods in the past week, denies any fevers, chills, or diaphoresis.  Further, denies CP/SOB/GONZALEZ/palpitations/dizziness/lightheadedness.  Of note, patient was admitted here 6 weeks ago for abdominal pain, was diagnosed with pancreatitis (with CT showing mild interstitial inflammatory changes of the pancreatic head).  Has not followed with GI on an outpatient basis since discharge.      PAST MEDICAL HISTORY:  Anxiety    PAST SURGICAL HISTORY:  History of bariatric gastric sleeve performed 2y ago   History of appendectomy   History of repair of pyloric stenosis  History of excision of pilonidal cyst    REVIEW OF SYSTEMS:  General/Constitutional: No acute distress, no headache, weakness, fevers, or chills   HEENT: Denies auditory or visual changes/disturbances, no vertigo, no throat pain, no dysphagia    Neck: Denies neck pain/stiffness, denies swelling/lumps/hoarseness   Lymphatic: Denies lumps or swelling in the axillae, groin, or neck bilaterally   Respiratory: Denies cough/hemoptysis, denies wheezing/SOB/dyspnea  Cardiac: Denies chest pain, palpitations  Abdomen: See HPI   Extremities: Denies sores, swelling, discoloration bilat UE/LE  Genitourinary: Denies urinary issues or complaints, denies dysuria/hematuria  Neuro: Denies weakness, paraesthesias, paralysis, syncope, loss of vision  Skin: Denies pruritus, pain, rashes  Psych: Denies hallucinations, visual disturbances, or depression    MEDICATIONS:  Home Medications:  FLUoxetine 20 mg oral capsule: 1 cap(s) orally once a day (22 Oct 2023 11:31)   mg oral tablet: 1 tab(s) orally every 6 hours, As Needed for pain (22 Oct 2023 11:31)  magnesium oxide 400 mg oral tablet: 1 tab(s) orally once a day (22 Oct 2023 11:31)  multivitamin: 1 tab(s) orally once a day (22 Oct 2023 11:31)  omeprazole 40 mg oral delayed release capsule: 1 cap(s) orally once a day (22 Oct 2023 11:31)  Tylenol 500 mg oral tablet: 2 tab(s) orally every 6 hours, As Needed for pain (19 Sep 2021 05:39)  Vitamin D3 125 mcg (5000 intl units) oral tablet: 1 tab(s) orally once a day (18 Sep 2021 10:45)    ALLERGIES:  Denies food, environmental, and medication allergies     SOCIAL HISTORY:  Never smoker  Quit ETOH after prior hospitalization here 6 weeks ago   Denies drug use  Works as  at [employer]  Single, lives at home with family   COVID vaccinated and boosted  Not flu vaccinated as of yet     FAMILY HISTORY:  No pertinent family history in first degree relatives    VITAL SIGNS:  Vital Signs Last 24 Hrs  T(C): 37 (22 Oct 2023 10:28), Max: 37 (22 Oct 2023 10:28)  T(F): 98.6 (22 Oct 2023 10:28), Max: 98.6 (22 Oct 2023 10:28)  HR: 101 (22 Oct 2023 10:28) (101 - 101)  BP: 108/63 (22 Oct 2023 10:28) (108/63 - 108/63)  RR: 18 (22 Oct 2023 10:28) (18 - 18)  SpO2: 96% (22 Oct 2023 10:28) (96% - 96%)  Parameters below as of 22 Oct 2023 10:28  Patient On (Oxygen Delivery Method): room air    PHYSICAL EXAM:  General: No acute distress, appears comfortable, well-groomed, appears stated age  Head, Eyes, Ears, Nose, Throat: Normal cephalic/atraumatic, anicteric, conjunctiva-non injected and moist, vision grossly intact, hearing grossly intact, no nasal discharge, ears and nose symmetrical and atraumatic.  Nasal, oral, and oropharyngeal mucosa pink but somewhat dry -  no evidence of ulcerations  Neck: Supple, carotids have good upstroke, trachea in the midline, without JVD or thyromegaly  Lymphatic: No evidence of masses or lymphadenopathy in the head, neck, trunk, axillary, inguinal, or supraclavicular regions  Chest: Lungs are clear to P&A, no wheezing, no rales, no ronchi, with good inspiratory effort  Heart: Heart rhythm regular, no murmurs  Abdomen: Soft, non tender, good bowel sounds present in all four quadrants.  No guarding, rebound, and no peritoneal signs.  No evidence of hepatosplenomegaly.  No evidence of abdominal wall hernias.  Inguinal regions are unremarkable with no evidence of hernias.   Extremity: No swelling, or open sores, no gross deformities,  good range of motion, 2+ peripheral pulses bilat UE/LE, no edema,  negative Ziggy's sign, no lymphadenopathy  Neuro: Alert and oriented x3, motor and sensory intact  Psychiatric: Awake , alert, oriented x3 with an appropriate affect.   Skin: Good color, turgor, texture with no gross lesions, no eruptions, no rashes, no subcutaneous nodules and normal temperature.     LABS:                        17.7   8.70  )-----------( 234      ( 22 Oct 2023 11:05 )             50.3     10-    139  |  103  |  13  ----------------------------<  83  3.5   |  21<L>  |  0.73    Ca    8.4<L>      22 Oct 2023 11:05    TPro  6.6  /  Alb  2.7<L>  /  TBili  2.1<H>  /  DBili  x   /  AST  42<H>  /  ALT  55  /  AlkPhos  64  10      Urinalysis Basic - ( 22 Oct 2023 13:00 )    Color: Yellow / Appearance: Clear / S.061 / pH: x  Gluc: x / Ketone: 80 mg/dL  / Bili: Negative / Urobili: 1.0 mg/dL   Blood: x / Protein: Negative mg/dL / Nitrite: Negative   Leuk Esterase: Negative / RBC: x / WBC x   Sq Epi: x / Non Sq Epi: x / Bacteria: x      LIVER FUNCTIONS - ( 22 Oct 2023 11:05 )  Alb: 2.7 g/dL / Pro: 6.6 g/dL / ALK PHOS: 64 U/L / ALT: 55 U/L / AST: 42 U/L / GGT: x               RADIOLOGY & ADDITIONAL STUDIES:    ASSESSMENT:    PLAN: SURGERY PA CONSULT NOTE:    CHIEF COMPLAINT:  Patient is a 38y old male who presents with a chief complaint of abdominal pain, nausea, and vomiting x 4 days    HPI:  This is a pleasant, 38-yo male with history of anxiety who presents to the ED today with complaints of upper abdominal pain, NBNB vomiting, and non-bloody diarrhea for the past 4 days.  Reports that abdominal pain and vomiting started a few hours after eating breakfast last Wednesday, followed by diarrhea about an hour later after which the abdominal pain subsided.  He finished work, went home, and had recurrence of similar symptom pattern for the next 2 days.  On Friday, he reports that the pain and the frequency of diarrhea worsened - pain is now constant, and has had 11-13 bouts of diarrhea on Friday and yesterday.  Has also noticed that his urine has been somewhat darker than usual since symptoms began - denies hematuria or other urinary complaints.  Denies any recent meals of undercooked or raw foods in the past week, denies any fevers, chills, or diaphoresis.  Further, denies CP/SOB/GONZALEZ/palpitations/dizziness/lightheadedness.  Of note, patient was seen in the ED here 6 weeks ago for abdominal pain, was diagnosed with pancreatitis (with CT showing mild interstitial inflammatory changes of the pancreatic head).  He was not seen by either GI or surgery at that time, and has not followed with GI on an outpatient basis since that visit.      PAST MEDICAL HISTORY:  Anxiety    PAST SURGICAL HISTORY:  History of bariatric gastric sleeve performed 2y ago   History of appendectomy   History of repair of pyloric stenosis  History of excision of pilonidal cyst    REVIEW OF SYSTEMS:  General/Constitutional: No acute distress, no headache, weakness, fevers, or chills   HEENT: Denies auditory or visual changes/disturbances, no vertigo, no throat pain, no dysphagia    Neck: Denies neck pain/stiffness, denies swelling/lumps/hoarseness   Lymphatic: Denies lumps or swelling in the axillae, groin, or neck bilaterally   Respiratory: Denies cough/hemoptysis, denies wheezing/SOB/dyspnea  Cardiac: Denies chest pain, palpitations  Abdomen: See HPI   Extremities: Denies sores, swelling, discoloration bilat UE/LE  Genitourinary: Denies urinary issues or complaints, denies dysuria/hematuria  Neuro: Denies weakness, paraesthesias, paralysis, syncope, loss of vision  Skin: Denies pruritus, pain, rashes  Psych: Denies hallucinations, visual disturbances, or depression    MEDICATIONS:  Home Medications:  FLUoxetine 20 mg oral capsule: 1 cap(s) orally once a day (22 Oct 2023 11:31)   mg oral tablet: 1 tab(s) orally every 6 hours, As Needed for pain (22 Oct 2023 11:31)  magnesium oxide 400 mg oral tablet: 1 tab(s) orally once a day (22 Oct 2023 11:31)  multivitamin: 1 tab(s) orally once a day (22 Oct 2023 11:31)  omeprazole 40 mg oral delayed release capsule: 1 cap(s) orally once a day (22 Oct 2023 11:31)  Tylenol 500 mg oral tablet: 2 tab(s) orally every 6 hours, As Needed for pain (19 Sep 2021 05:39)  Vitamin D3 125 mcg (5000 intl units) oral tablet: 1 tab(s) orally once a day (18 Sep 2021 10:45)    ALLERGIES:  Denies food, environmental, and medication allergies     SOCIAL HISTORY:  Never smoker  Quit ETOH after prior hospitalization here 6 weeks ago   Denies drug use  Works as  at [employer]  Single, lives at home with family   COVID vaccinated and boosted  Not flu vaccinated as of yet     FAMILY HISTORY:  No pertinent family history in first degree relatives    VITAL SIGNS:  Vital Signs Last 24 Hrs  T(C): 37 (22 Oct 2023 10:28), Max: 37 (22 Oct 2023 10:28)  T(F): 98.6 (22 Oct 2023 10:28), Max: 98.6 (22 Oct 2023 10:28)  HR: 101 (22 Oct 2023 10:28) (101 - 101)  BP: 108/63 (22 Oct 2023 10:28) (108/63 - 108/63)  RR: 18 (22 Oct 2023 10:28) (18 - 18)  SpO2: 96% (22 Oct 2023 10:28) (96% - 96%)  Parameters below as of 22 Oct 2023 10:28  Patient On (Oxygen Delivery Method): room air    PHYSICAL EXAM:  General: No acute distress, appears comfortable, well-groomed, appears stated age  Head, Eyes, Ears, Nose, Throat: Normal cephalic/atraumatic, anicteric, conjunctiva-non injected and moist, vision grossly intact, hearing grossly intact, no nasal discharge, ears and nose symmetrical and atraumatic.  Nasal, oral, and oropharyngeal mucosa pink but somewhat dry -  no evidence of ulcerations  Neck: Supple, carotids have good upstroke, trachea in the midline, without JVD or thyromegaly  Chest: Lungs are clear to P&A, no wheezing, no rales, no ronchi, with good inspiratory effort  Heart: Heart rhythm regular, no murmurs  Abdomen: Old, healed laparoscopic portal incision scars from prior sleeve gastrectomy noted.  Soft, hyperactive bowel sounds present in all four quadrants.  Tenderness noted to deep palpation across the entirety of the upper abdomen.  No guarding, rebound, and no peritoneal signs.  No evidence of hepatosplenomegaly.  No evidence of abdominal wall hernias.  Inguinal regions are unremarkable with no evidence of hernias.   Extremity: No swelling, or open sores, no gross deformities,  good range of motion, 2+ peripheral pulses bilat UE/LE, no edema,  negative Ziggy's sign, no lymphadenopathy  Neuro: Alert and oriented x3, motor and sensory intact  Psychiatric: Awake , alert, oriented x3 with an appropriate affect.   Skin: Good color, turgor, texture with no gross lesions, no eruptions, no rashes, no subcutaneous nodules and normal temperature.     LABS:                        17.7   8.70  )-----------( 234      ( 22 Oct 2023 11:05 )             50.3     10    139  |  103  |  13  ----------------------------<  83  3.5   |  21<L>  |  0.73    Ca    8.4<L>      22 Oct 2023 11:05    TPro  6.6  /  Alb  2.7<L>  /  TBili  2.1<H>  /  DBili  x   /  AST  42<H>  /  ALT  55  /  AlkPhos  64  10-22  Urinalysis Basic - ( 22 Oct 2023 13:00 )  Color: Yellow / Appearance: Clear / S.061 / pH: x  Gluc: x / Ketone: 80 mg/dL  / Bili: Negative / Urobili: 1.0 mg/dL   Blood: x / Protein: Negative mg/dL / Nitrite: Negative   Leuk Esterase: Negative / RBC: x / WBC x   Sq Epi: x / Non Sq Epi: x / Bacteria: x    LIVER FUNCTIONS - ( 22 Oct 2023 11:05 )  Alb: 2.7 g/dL / Pro: 6.6 g/dL / ALK PHOS: 64 U/L / ALT: 55 U/L / AST: 42 U/L / GGT: x           RADIOLOGY & ADDITIONAL STUDIES:  ACC: [id number] EXAM:  CT ABDOMEN AND PELVIS IC   ORDERED BY:  ZACH MITCHELL   PROCEDURE DATE:  10/22/2023    INTERPRETATION:  CLINICAL INFORMATION: Nausea, vomiting, and diarrhea for   five days. History of gastric sleeve.  COMPARISON: Most recent CT scan of abdomen and pelvis and right upper   quadrant ultrasound exam, both from 2023. Additional CT scans from   2021 and 2012.  CONTRAST/COMPLICATIONS:  IV Contrast: Omnipaque 350  90 cc administered   10 cc discarded  Oral Contrast: NONE  Complications: None reported at time of study completion  PROCEDURE:  CT of the Abdomen and Pelvis was performed.  Sagittal and coronal reformats were performed.  FINDINGS:  LOWER CHEST: There are multiple new centrilobular nodular opacities in   the posterior basal segment of the right lower lobe, consistent with   either aspiration or infection.  LIVER: Steatosis.  BILE DUCTS: Normal caliber.  GALLBLADDER: Within normal limits.  SPLEEN: Within normal limits.  PANCREAS: Within normal limits.  ADRENALS: Within normal limits.  KIDNEYS/URETERS: Within normal limits.  BLADDER: Within normal limits.  REPRODUCTIVE ORGANS: Prostate within normal limits.  BOWEL: Again post sleeve gastrectomy. No bowel obstruction. Appendix has   been resected.  PERITONEUM: No ascites.  VESSELS: Within normal limits.  RETROPERITONEUM/LYMPH NODES: No lymphadenopathy.  ABDOMINAL WALL: Within normal limits.  BONES: Within normal limits.  IMPRESSION:  1. Since 2023, there are multiple new centrilobular nodular opacities   in the right lower lobe, consistent with either aspiration or infection.  2. No acute abnormality of the gastrointestinal tract is seen.  HANH OMALLEY MD; Attending Radiologist  This document has been electronically signed. Oct 22 2023 12:28PM      ACC: [id number] EXAM:  US ABDOMEN RT UPR QUADRANT   ORDERED BY:  KENNEY MEJÍA   PROCEDURE DATE:  10/22/2023    INTERPRETATION:  CLINICAL INFORMATION: Abdominal pain. Evaluate for   cholecystitis versus gallstones.  COMPARISON: Abdominal ultrasound exam from 2023. CT scans of abdomen   and pelvis from 10/22/2023, 2023, 2021, and 2012.  TECHNIQUE: Sonography of the right upper quadrant.  FINDINGS:  Liver: The liver is normal in size. It is moderately increased in   echogenicity consistent with moderate hepatic steatosis. No focal hepatic   lesions are identified.  Bile ducts: Normal caliber. Common bile duct measures 0.3 cm.  Gallbladder: Within normal limits.  Pancreas: Visualized portions are within normal limits.  Right kidney: 11.4 cm. No hydronephrosis.  Ascites: None.  IVC: Visualized portions are within normal limits.  IMPRESSION:  1. Normal gallbladder.  2. Hepatic steatosis.  HANH OMALLEY MD; Attending Radiologist  This document has been electronically signed. Oct 22 2023 12:31PM    ASSESSMENT:  38-yo male with history of anxiety, s/p prior sleeve gastrectomy 2y ago, s/p prior appendectomy, s/p prior pyloric stenosis correction   Presents to the ED today with c/o upper abdominal pain, nausea, NBNB vomiting, and non-bloody diarrhea x 4 days  Initial imaging essentially negative for any organic pathology   Possible enteritis, vs. possible chronic pancreatitis  No suspicion for obstructive biliary pathology     PLAN:  Patient currently resting comfortably supine in stretcher  No leukocytosis  H&H/platelets stable   Afebrile at present   Mild tachycardia and mild hypotension noted (as expected considering fluid losses with n/d)  Lytes/renal function intact and wnl   Lipase and lactate both wnl as well  Elevated t. bili noted, though on-trend with prior ED visit (? Gilbert's syndrome)  No suspicion of obstructive biliary pathology considering normal imaging, normal white count, and remainder of hepatic function testing   Patients current symptomatology does not appear to be surgical in nature  Recommend GI consultation for further evaluation and management   Discussed with Dr. Kelly, and with Dr. Mejía in the ED   Will sign-off care at this time, though we remain available for re-consultation as needed

## 2023-10-22 NOTE — ED PROVIDER NOTE - CARE PROVIDER_API CALL
Natan Hastings  Gastroenterology  [address] [place] [address]  Phone: [phone]  Fax: [phone]  Follow Up Time:

## 2023-10-23 LAB
CULTURE RESULTS: SIGNIFICANT CHANGE UP
CULTURE RESULTS: SIGNIFICANT CHANGE UP
SPECIMEN SOURCE: SIGNIFICANT CHANGE UP
SPECIMEN SOURCE: SIGNIFICANT CHANGE UP

## 2024-03-03 ENCOUNTER — INPATIENT (INPATIENT)
Facility: HOSPITAL | Age: 39
LOS: 1 days | Discharge: HOME | DRG: 440 | End: 2024-03-05
Attending: HOSPITALIST | Admitting: HOSPITALIST
Payer: MEDICAID

## 2024-03-03 VITALS
HEART RATE: 93 BPM | TEMPERATURE: 98 F | WEIGHT: 214.95 LBS | DIASTOLIC BLOOD PRESSURE: 84 MMHG | RESPIRATION RATE: 18 BRPM | SYSTOLIC BLOOD PRESSURE: 116 MMHG | OXYGEN SATURATION: 98 % | HEIGHT: 68 IN

## 2024-03-03 DIAGNOSIS — Z90.49 ACQUIRED ABSENCE OF OTHER SPECIFIED PARTS OF DIGESTIVE TRACT: Chronic | ICD-10-CM

## 2024-03-03 DIAGNOSIS — Z98.84 BARIATRIC SURGERY STATUS: Chronic | ICD-10-CM

## 2024-03-03 DIAGNOSIS — K85.90 ACUTE PANCREATITIS WITHOUT NECROSIS OR INFECTION, UNSPECIFIED: ICD-10-CM

## 2024-03-03 DIAGNOSIS — Z98.890 OTHER SPECIFIED POSTPROCEDURAL STATES: Chronic | ICD-10-CM

## 2024-03-03 LAB
ALBUMIN SERPL ELPH-MCNC: 3.6 G/DL — SIGNIFICANT CHANGE UP (ref 3.3–5)
ALP SERPL-CCNC: 68 U/L — SIGNIFICANT CHANGE UP (ref 40–120)
ALT FLD-CCNC: 178 U/L — HIGH (ref 12–78)
ANION GAP SERPL CALC-SCNC: 14 MMOL/L — SIGNIFICANT CHANGE UP (ref 5–17)
ANION GAP SERPL CALC-SCNC: 17 MMOL/L — SIGNIFICANT CHANGE UP (ref 5–17)
APPEARANCE UR: CLEAR — SIGNIFICANT CHANGE UP
AST SERPL-CCNC: 191 U/L — HIGH (ref 15–37)
BACTERIA # UR AUTO: ABNORMAL /HPF
BASOPHILS # BLD AUTO: 0.03 K/UL — SIGNIFICANT CHANGE UP (ref 0–0.2)
BASOPHILS NFR BLD AUTO: 0.3 % — SIGNIFICANT CHANGE UP (ref 0–2)
BILIRUB SERPL-MCNC: 1.6 MG/DL — HIGH (ref 0.2–1.2)
BILIRUB UR-MCNC: NEGATIVE — SIGNIFICANT CHANGE UP
BUN SERPL-MCNC: 5 MG/DL — LOW (ref 7–23)
BUN SERPL-MCNC: 7 MG/DL — SIGNIFICANT CHANGE UP (ref 7–23)
CALCIUM SERPL-MCNC: 8 MG/DL — LOW (ref 8.5–10.1)
CALCIUM SERPL-MCNC: 9.1 MG/DL — SIGNIFICANT CHANGE UP (ref 8.5–10.1)
CHLORIDE SERPL-SCNC: 105 MMOL/L — SIGNIFICANT CHANGE UP (ref 96–108)
CHLORIDE SERPL-SCNC: 107 MMOL/L — SIGNIFICANT CHANGE UP (ref 96–108)
CO2 SERPL-SCNC: 23 MMOL/L — SIGNIFICANT CHANGE UP (ref 22–31)
CO2 SERPL-SCNC: 23 MMOL/L — SIGNIFICANT CHANGE UP (ref 22–31)
COLOR SPEC: SIGNIFICANT CHANGE UP
CREAT SERPL-MCNC: 0.68 MG/DL — SIGNIFICANT CHANGE UP (ref 0.5–1.3)
CREAT SERPL-MCNC: 0.84 MG/DL — SIGNIFICANT CHANGE UP (ref 0.5–1.3)
DIFF PNL FLD: NEGATIVE — SIGNIFICANT CHANGE UP
EGFR: 114 ML/MIN/1.73M2 — SIGNIFICANT CHANGE UP
EGFR: 121 ML/MIN/1.73M2 — SIGNIFICANT CHANGE UP
EOSINOPHIL # BLD AUTO: 0 K/UL — SIGNIFICANT CHANGE UP (ref 0–0.5)
EOSINOPHIL NFR BLD AUTO: 0 % — SIGNIFICANT CHANGE UP (ref 0–6)
EPI CELLS # UR: PRESENT
GLUCOSE SERPL-MCNC: 84 MG/DL — SIGNIFICANT CHANGE UP (ref 70–99)
GLUCOSE SERPL-MCNC: 88 MG/DL — SIGNIFICANT CHANGE UP (ref 70–99)
GLUCOSE UR QL: NEGATIVE MG/DL — SIGNIFICANT CHANGE UP
HCT VFR BLD CALC: 45.7 % — SIGNIFICANT CHANGE UP (ref 39–50)
HGB BLD-MCNC: 16.2 G/DL — SIGNIFICANT CHANGE UP (ref 13–17)
IMM GRANULOCYTES NFR BLD AUTO: 0.5 % — SIGNIFICANT CHANGE UP (ref 0–0.9)
KETONES UR-MCNC: 80 MG/DL
LACTATE SERPL-SCNC: 1.6 MMOL/L — SIGNIFICANT CHANGE UP (ref 0.7–2)
LACTATE SERPL-SCNC: 3.9 MMOL/L — HIGH (ref 0.7–2)
LEUKOCYTE ESTERASE UR-ACNC: NEGATIVE — SIGNIFICANT CHANGE UP
LIDOCAIN IGE QN: 2184 U/L — HIGH (ref 13–75)
LYMPHOCYTES # BLD AUTO: 0.73 K/UL — LOW (ref 1–3.3)
LYMPHOCYTES # BLD AUTO: 7.5 % — LOW (ref 13–44)
MCHC RBC-ENTMCNC: 35.4 GM/DL — SIGNIFICANT CHANGE UP (ref 32–36)
MCHC RBC-ENTMCNC: 35.8 PG — HIGH (ref 27–34)
MCV RBC AUTO: 100.9 FL — HIGH (ref 80–100)
MONOCYTES # BLD AUTO: 0.8 K/UL — SIGNIFICANT CHANGE UP (ref 0–0.9)
MONOCYTES NFR BLD AUTO: 8.2 % — SIGNIFICANT CHANGE UP (ref 2–14)
NEUTROPHILS # BLD AUTO: 8.09 K/UL — HIGH (ref 1.8–7.4)
NEUTROPHILS NFR BLD AUTO: 83.5 % — HIGH (ref 43–77)
NITRITE UR-MCNC: NEGATIVE — SIGNIFICANT CHANGE UP
NRBC # BLD: 0 /100 WBCS — SIGNIFICANT CHANGE UP (ref 0–0)
PH UR: 6 — SIGNIFICANT CHANGE UP (ref 5–8)
PLATELET # BLD AUTO: 167 K/UL — SIGNIFICANT CHANGE UP (ref 150–400)
POTASSIUM SERPL-MCNC: 3.4 MMOL/L — LOW (ref 3.5–5.3)
POTASSIUM SERPL-MCNC: 3.7 MMOL/L — SIGNIFICANT CHANGE UP (ref 3.5–5.3)
POTASSIUM SERPL-SCNC: 3.4 MMOL/L — LOW (ref 3.5–5.3)
POTASSIUM SERPL-SCNC: 3.7 MMOL/L — SIGNIFICANT CHANGE UP (ref 3.5–5.3)
PROT SERPL-MCNC: 8 G/DL — SIGNIFICANT CHANGE UP (ref 6–8.3)
PROT UR-MCNC: 30 MG/DL
RBC # BLD: 4.53 M/UL — SIGNIFICANT CHANGE UP (ref 4.2–5.8)
RBC # FLD: 12.6 % — SIGNIFICANT CHANGE UP (ref 10.3–14.5)
RBC CASTS # UR COMP ASSIST: 0 /HPF — SIGNIFICANT CHANGE UP (ref 0–4)
SODIUM SERPL-SCNC: 144 MMOL/L — SIGNIFICANT CHANGE UP (ref 135–145)
SODIUM SERPL-SCNC: 145 MMOL/L — SIGNIFICANT CHANGE UP (ref 135–145)
SP GR SPEC: 1.04 — HIGH (ref 1–1.03)
UROBILINOGEN FLD QL: 1 MG/DL — SIGNIFICANT CHANGE UP (ref 0.2–1)
WBC # BLD: 9.7 K/UL — SIGNIFICANT CHANGE UP (ref 3.8–10.5)
WBC # FLD AUTO: 9.7 K/UL — SIGNIFICANT CHANGE UP (ref 3.8–10.5)
WBC UR QL: 1 /HPF — SIGNIFICANT CHANGE UP (ref 0–5)

## 2024-03-03 PROCEDURE — 99285 EMERGENCY DEPT VISIT HI MDM: CPT

## 2024-03-03 PROCEDURE — 76705 ECHO EXAM OF ABDOMEN: CPT | Mod: 26

## 2024-03-03 PROCEDURE — 74177 CT ABD & PELVIS W/CONTRAST: CPT | Mod: 26,MC

## 2024-03-03 RX ORDER — SODIUM CHLORIDE 9 MG/ML
1000 INJECTION INTRAMUSCULAR; INTRAVENOUS; SUBCUTANEOUS ONCE
Refills: 0 | Status: COMPLETED | OUTPATIENT
Start: 2024-03-03 | End: 2024-03-03

## 2024-03-03 RX ORDER — ACETAMINOPHEN 500 MG
650 TABLET ORAL EVERY 6 HOURS
Refills: 0 | Status: DISCONTINUED | OUTPATIENT
Start: 2024-03-03 | End: 2024-03-05

## 2024-03-03 RX ORDER — ACETAMINOPHEN 500 MG
2 TABLET ORAL
Qty: 0 | Refills: 0 | DISCHARGE

## 2024-03-03 RX ORDER — LANOLIN ALCOHOL/MO/W.PET/CERES
3 CREAM (GRAM) TOPICAL AT BEDTIME
Refills: 0 | Status: DISCONTINUED | OUTPATIENT
Start: 2024-03-03 | End: 2024-03-04

## 2024-03-03 RX ORDER — SODIUM CHLORIDE 9 MG/ML
1000 INJECTION, SOLUTION INTRAVENOUS
Refills: 0 | Status: DISCONTINUED | OUTPATIENT
Start: 2024-03-03 | End: 2024-03-04

## 2024-03-03 RX ORDER — SODIUM CHLORIDE 9 MG/ML
1000 INJECTION, SOLUTION INTRAVENOUS ONCE
Refills: 0 | Status: COMPLETED | OUTPATIENT
Start: 2024-03-03 | End: 2024-03-03

## 2024-03-03 RX ORDER — FLUOXETINE HCL 10 MG
30 CAPSULE ORAL DAILY
Refills: 0 | Status: DISCONTINUED | OUTPATIENT
Start: 2024-03-03 | End: 2024-03-05

## 2024-03-03 RX ORDER — OMEPRAZOLE 10 MG/1
1 CAPSULE, DELAYED RELEASE ORAL
Qty: 0 | Refills: 0 | DISCHARGE

## 2024-03-03 RX ORDER — MORPHINE SULFATE 50 MG/1
4 CAPSULE, EXTENDED RELEASE ORAL EVERY 4 HOURS
Refills: 0 | Status: DISCONTINUED | OUTPATIENT
Start: 2024-03-03 | End: 2024-03-05

## 2024-03-03 RX ORDER — CHOLECALCIFEROL (VITAMIN D3) 125 MCG
1 CAPSULE ORAL
Qty: 0 | Refills: 0 | DISCHARGE

## 2024-03-03 RX ORDER — IOHEXOL 300 MG/ML
30 INJECTION, SOLUTION INTRAVENOUS ONCE
Refills: 0 | Status: COMPLETED | OUTPATIENT
Start: 2024-03-03 | End: 2024-03-03

## 2024-03-03 RX ORDER — ONDANSETRON 8 MG/1
4 TABLET, FILM COATED ORAL ONCE
Refills: 0 | Status: COMPLETED | OUTPATIENT
Start: 2024-03-03 | End: 2024-03-03

## 2024-03-03 RX ORDER — THIAMINE MONONITRATE (VIT B1) 100 MG
100 TABLET ORAL DAILY
Refills: 0 | Status: DISCONTINUED | OUTPATIENT
Start: 2024-03-03 | End: 2024-03-05

## 2024-03-03 RX ORDER — IBUPROFEN 200 MG
1 TABLET ORAL
Qty: 0 | Refills: 0 | DISCHARGE

## 2024-03-03 RX ORDER — MORPHINE SULFATE 50 MG/1
4 CAPSULE, EXTENDED RELEASE ORAL ONCE
Refills: 0 | Status: DISCONTINUED | OUTPATIENT
Start: 2024-03-03 | End: 2024-03-03

## 2024-03-03 RX ORDER — FLUOXETINE HCL 10 MG
1 CAPSULE ORAL
Qty: 0 | Refills: 0 | DISCHARGE

## 2024-03-03 RX ORDER — ONDANSETRON 8 MG/1
4 TABLET, FILM COATED ORAL EVERY 8 HOURS
Refills: 0 | Status: DISCONTINUED | OUTPATIENT
Start: 2024-03-03 | End: 2024-03-05

## 2024-03-03 RX ORDER — FOLIC ACID 0.8 MG
1 TABLET ORAL DAILY
Refills: 0 | Status: DISCONTINUED | OUTPATIENT
Start: 2024-03-03 | End: 2024-03-05

## 2024-03-03 RX ORDER — MAGNESIUM OXIDE 400 MG ORAL TABLET 241.3 MG
1 TABLET ORAL
Qty: 0 | Refills: 0 | DISCHARGE

## 2024-03-03 RX ORDER — FLUOXETINE HCL 10 MG
3 CAPSULE ORAL
Refills: 0 | DISCHARGE

## 2024-03-03 RX ADMIN — IOHEXOL 30 MILLILITER(S): 300 INJECTION, SOLUTION INTRAVENOUS at 08:39

## 2024-03-03 RX ADMIN — SODIUM CHLORIDE 1000 MILLILITER(S): 9 INJECTION INTRAMUSCULAR; INTRAVENOUS; SUBCUTANEOUS at 08:34

## 2024-03-03 RX ADMIN — SODIUM CHLORIDE 1000 MILLILITER(S): 9 INJECTION INTRAMUSCULAR; INTRAVENOUS; SUBCUTANEOUS at 09:13

## 2024-03-03 RX ADMIN — MORPHINE SULFATE 4 MILLIGRAM(S): 50 CAPSULE, EXTENDED RELEASE ORAL at 21:10

## 2024-03-03 RX ADMIN — MORPHINE SULFATE 4 MILLIGRAM(S): 50 CAPSULE, EXTENDED RELEASE ORAL at 21:40

## 2024-03-03 RX ADMIN — MORPHINE SULFATE 4 MILLIGRAM(S): 50 CAPSULE, EXTENDED RELEASE ORAL at 09:15

## 2024-03-03 RX ADMIN — SODIUM CHLORIDE 1000 MILLILITER(S): 9 INJECTION, SOLUTION INTRAVENOUS at 14:05

## 2024-03-03 RX ADMIN — MORPHINE SULFATE 4 MILLIGRAM(S): 50 CAPSULE, EXTENDED RELEASE ORAL at 10:29

## 2024-03-03 RX ADMIN — ONDANSETRON 4 MILLIGRAM(S): 8 TABLET, FILM COATED ORAL at 15:55

## 2024-03-03 RX ADMIN — MORPHINE SULFATE 4 MILLIGRAM(S): 50 CAPSULE, EXTENDED RELEASE ORAL at 15:55

## 2024-03-03 RX ADMIN — SODIUM CHLORIDE 125 MILLILITER(S): 9 INJECTION, SOLUTION INTRAVENOUS at 15:48

## 2024-03-03 RX ADMIN — SODIUM CHLORIDE 1000 MILLILITER(S): 9 INJECTION INTRAMUSCULAR; INTRAVENOUS; SUBCUTANEOUS at 11:41

## 2024-03-03 RX ADMIN — ONDANSETRON 4 MILLIGRAM(S): 8 TABLET, FILM COATED ORAL at 08:36

## 2024-03-03 RX ADMIN — SODIUM CHLORIDE 1000 MILLILITER(S): 9 INJECTION INTRAMUSCULAR; INTRAVENOUS; SUBCUTANEOUS at 11:08

## 2024-03-03 RX ADMIN — MORPHINE SULFATE 4 MILLIGRAM(S): 50 CAPSULE, EXTENDED RELEASE ORAL at 12:40

## 2024-03-03 RX ADMIN — MORPHINE SULFATE 4 MILLIGRAM(S): 50 CAPSULE, EXTENDED RELEASE ORAL at 16:28

## 2024-03-03 RX ADMIN — MORPHINE SULFATE 4 MILLIGRAM(S): 50 CAPSULE, EXTENDED RELEASE ORAL at 08:35

## 2024-03-03 NOTE — PATIENT PROFILE ADULT - FALL HARM RISK - UNIVERSAL INTERVENTIONS
Bed in lowest position, wheels locked, appropriate side rails in place/Call bell, personal items and telephone in reach/Instruct patient to call for assistance before getting out of bed or chair/Non-slip footwear when patient is out of bed/[place] to call system/Physically safe environment - no spills, clutter or unnecessary equipment/Purposeful Proactive Rounding/Room/bathroom lighting operational, light cord in reach

## 2024-03-03 NOTE — ED ADULT NURSE NOTE - OBJECTIVE STATEMENT
the patient presents to the er with complaints of mid abdominal pain which started yesterday at approximately 6-7pm.  he started to take Tylenol for pain w/no relief of symptoms.  No n/v.  Last BM was yesterday am.  He has a h/o pancreatitis, was seen in er for it back in september, it did not require hospitalization.  He has a h/o bariatric surgery (2021) and appendectomy in 2020.  states he is passing gas.  Drinks alcohol, states "not regularly", last intake was Friday night.  He denies any allergies.  he is alert / oriented x4.  iv access obtained (#20 rac).  all labs / swab collected, medicated for pain, PO contrast initiated.  awaiting urine sample.

## 2024-03-03 NOTE — H&P ADULT - HISTORY OF PRESENT ILLNESS
39M with hx of etoh pancreatitis, gastric sleeve presenting with abdominal pain. Pt reports he drinks "2 tall boys" 4-5x/week. Denies withdrawing in the past. He cut down on drinking since having pancreatitis in september. He developed severe abdominal pain yesterday. Pain is sharp epigastric and radiates to the back. associated with anorexia but no n/v.

## 2024-03-03 NOTE — ED PROVIDER NOTE - CLINICAL SUMMARY MEDICAL DECISION MAKING FREE TEXT BOX
39-year-old male, history of gastric sleeve surgery , pancreatitis, appendectomy, pyloric stenosis surgery (as a child), pw left sided abdominal pain since last night. last BM  yesteday morning but feels constipated and unable to pass flatus. no vomiting, no fevers, no chills. has taken tylenol with no relief  states this feels like his pancreatitis. pt drinks about 2-3 times a week, not daily   labs, ua, ct  eval acute intraabdominal path  pancreatitis, divertic, sbo, kidney stone

## 2024-03-03 NOTE — ED ADULT NURSE REASSESSMENT NOTE - NS ED NURSE REASSESS COMMENT FT1
1109:  back from CT scan.  still complaining of pain.  lactate is elevated (over 3- md aware) and second liter to be given with a repeat lactate after completion of second liter as per MD.  awaiting CT results, and evaluation by Dr. Luo.  coretta agee.

## 2024-03-03 NOTE — ED ADULT NURSE NOTE - HISTORY OF COVID-19 VACCINATION
Vaccine status unknown
Acute liver failure without hepatic coma    Alcohol abuse    Atrial fibrillation, unspecified type    Hepatic encephalopathy    Hernia of abdominal cavity    Umbilical hernia

## 2024-03-03 NOTE — ED ADULT NURSE REASSESSMENT NOTE - NS ED NURSE REASSESS COMMENT FT1
1210:  continues to complain of pain.  MD at bedside, informed that he does in fact have an acute pancreatitis and will be evaluated by Dr. Luo.  the patient was told that being evaluated by surgery does not mean that he will require surgery.  the patient was told more than once that he is NOT permitted to take anything by mouth and he must remain NPO at this time to give things a rest.  The patient was medicated with another 4mg of morphine for pain.  awaiting evaluation by Vaelrio and plan of care to be determined.  coretta agee.

## 2024-03-03 NOTE — ED PROVIDER NOTE - OBJECTIVE STATEMENT
39-year-old male, history of gastric sleeve surgery , pancreatitis, appendectomy, pyloric stenosis surgery (as a child), pw left sided abdominal pain since last night. last BM  yesteday morning but feels constipated and unable to pass flatus. no vomiting, no fevers, no chills. has taken tylenol with no relief  states this feels like his pancreatitis. pt drinks about 2-3 times a week, not daily

## 2024-03-03 NOTE — H&P ADULT - ASSESSMENT
39M presenting with acute pancreatitis  likely etoh  lfts elevated likely due to etoh  no biliary disease on ct  will check RUQ US  pain control  npo  IVF  GI consulted

## 2024-03-03 NOTE — CONSULT NOTE ADULT - SUBJECTIVE AND OBJECTIVE BOX
HPI:      PAST MEDICAL & SURGICAL HISTORY:  H/O pyloric stenosis      Anxiety      History of repair of pyloric stenosis      History of excision of pilonidal cyst          REVIEW OF SYSTEMS      General:	    Skin/Breast:  	  Ophthalmologic:  	  ENMT:	    Respiratory and Thorax:  	  Cardiovascular:	    Gastrointestinal:	    Genitourinary:	    Musculoskeletal:	    Neurological:	    Psychiatric:	    Hematology/Lymphatics:	    Endocrine:	    Allergic/Immunologic:	    MEDICATIONS  (STANDING):  lactated ringers Bolus 1000 milliLiter(s) IV Bolus once  lactated ringers. 1000 milliLiter(s) (125 mL/Hr) IV Continuous <Continuous>    MEDICATIONS  (PRN):  acetaminophen     Tablet .. 650 milliGRAM(s) Oral every 6 hours PRN Temp greater or equal to 38C (100.4F), Mild Pain (1 - 3)  aluminum hydroxide/magnesium hydroxide/simethicone Suspension 30 milliLiter(s) Oral every 4 hours PRN Dyspepsia  melatonin 3 milliGRAM(s) Oral at bedtime PRN Insomnia  ondansetron Injectable 4 milliGRAM(s) IV Push every 8 hours PRN Nausea and/or Vomiting      Allergies    No Known Allergies    Intolerances        SOCIAL HISTORY:    FAMILY HISTORY:      Vital Signs Last 24 Hrs  T(C): 36.7 (03 Mar 2024 07:59), Max: 36.7 (03 Mar 2024 07:59)  T(F): 98 (03 Mar 2024 07:59), Max: 98 (03 Mar 2024 07:59)  HR: 93 (03 Mar 2024 07:59) (93 - 93)  BP: 116/84 (03 Mar 2024 07:59) (116/84 - 116/84)  BP(mean): --  RR: 18 (03 Mar 2024 07:59) (18 - 18)  SpO2: 98% (03 Mar 2024 07:59) (98% - 98%)    Parameters below as of 03 Mar 2024 07:59  Patient On (Oxygen Delivery Method): room air        PHYSICAL EXAM:      Constitutional:    Eyes:    ENMT:    Neck:    Breasts:    Back:    Respiratory:    Cardiovascular:    Gastrointestinal:    Genitourinary:    Rectal:    Extremities:    Vascular:    Neurological:    Skin:    Lymph Nodes:    Musculoskeletal:    Psychiatric:        LABS:                        16.2   9.70  )-----------( 167      ( 03 Mar 2024 08:35 )             45.7     -    145  |  105  |  7   ----------------------------<  84  3.7   |  23  |  0.84    Ca    9.1      03 Mar 2024 08:35    TPro  8.0  /  Alb  3.6  /  TBili  1.6<H>  /  DBili  x   /  AST  191<H>  /  ALT  178<H>  /  AlkPhos  68  03-03      Urinalysis Basic - ( 03 Mar 2024 11:34 )    Color: Dark Yellow / Appearance: Clear / S.039 / pH: x  Gluc: x / Ketone: 80 mg/dL  / Bili: Negative / Urobili: 1.0 mg/dL   Blood: x / Protein: 30 mg/dL / Nitrite: Negative   Leuk Esterase: Negative / RBC: 0 /HPF / WBC 1 /HPF   Sq Epi: x / Non Sq Epi: x / Bacteria: Occasional /HPF        RADIOLOGY & ADDITIONAL STUDIES: HPI:  Mr. Zavala is a 40 yo male, pmh anxiety, pancreatitis, s/p appendectomy, s/p LSG, EtOH use, presenting with a one day history of severe abdominal pain. Reports sudden onset of left sided abdominal pain that started last night around 7PM. Denies any nausea or vomiting, reports that he feels bloated and constipated. States that his symptoms are similar to the last time he has pancreatitis (last September). Denies any fevers or chills, has taken Tylenol without relief. Reports that he has a drink 3-4 times a week, usually 1-2 tallboys, does not drink liquor any more. Denies any CP or SOB.    PAST MEDICAL & SURGICAL HISTORY:  H/O pyloric stenosis    Anxiety    History of repair of pyloric stenosis    History of excision of pilonidal cyst      REVIEW OF SYSTEMS  Head: denies headaches, dizziness & lightheadedness  Eyes: denies changes in vision, eye pain, double vision & eye discharge  Ears: denies changes in hearing & ear discharge  Nose: denies rhinorrhea  Mouth: denies bleeding gums & sore tongue & sore throat  Neck: denies swollen lymph nodes   Respiratory: denies SOB, cough, sputum production, wheezing  Cardiac: denies CP & irregular heart beat  Abdominal: +abdominal pain  : denies dysuria, frequent urination, hematuria  Musculoskeletal: denies joint pain & muscle pain  Neuro: denies involuntary muscle movements  Psych: no depression, +anxiety     MEDICATIONS  (STANDING):  lactated ringers Bolus 1000 milliLiter(s) IV Bolus once  lactated ringers. 1000 milliLiter(s) (125 mL/Hr) IV Continuous <Continuous>    MEDICATIONS  (PRN):  acetaminophen     Tablet .. 650 milliGRAM(s) Oral every 6 hours PRN Temp greater or equal to 38C (100.4F), Mild Pain (1 - 3)  aluminum hydroxide/magnesium hydroxide/simethicone Suspension 30 milliLiter(s) Oral every 4 hours PRN Dyspepsia  melatonin 3 milliGRAM(s) Oral at bedtime PRN Insomnia  ondansetron Injectable 4 milliGRAM(s) IV Push every 8 hours PRN Nausea and/or Vomiting      Allergies  No Known Allergies    Vital Signs Last 24 Hrs  T(C): 36.7 (03 Mar 2024 07:59), Max: 36.7 (03 Mar 2024 07:59)  T(F): 98 (03 Mar 2024 07:59), Max: 98 (03 Mar 2024 07:59)  HR: 93 (03 Mar 2024 07:59) (93 - 93)  BP: 116/84 (03 Mar 2024 07:59) (116/84 - 116/84)  BP(mean): --  RR: 18 (03 Mar 2024 07:59) (18 - 18)  SpO2: 98% (03 Mar 2024 07:59) (98% - 98%)    Parameters below as of 03 Mar 2024 07:59  Patient On (Oxygen Delivery Method): room air        PHYSICAL EXAM:  Constitutional: AAOx3, no acute distress  HEENT: NCAT, airway patent  Cardiovascular: RRR, pulses present bilaterally  Respiratory: nonlabored breathing  Gastrointestinal: abdomen soft, diffusely tender to deep palpation, non distended, no rebound or guarding, no palpable masses  Neuro: no focal deficits  Extremities: non edematous, no calf pain bilaterally     LABS:                        16.2   9.70  )-----------( 167      ( 03 Mar 2024 08:35 )             45.7     03-    145  |  105  |  7   ----------------------------<  84  3.7   |  23  |  0.84    Ca    9.1      03 Mar 2024 08:35    TPro  8.0  /  Alb  3.6  /  TBili  1.6<H>  /  DBili  x   /  AST  191<H>  /  ALT  178<H>  /  AlkPhos  68  03-03      Urinalysis Basic - ( 03 Mar 2024 11:34 )    Color: Dark Yellow / Appearance: Clear / S.039 / pH: x  Gluc: x / Ketone: 80 mg/dL  / Bili: Negative / Urobili: 1.0 mg/dL   Blood: x / Protein: 30 mg/dL / Nitrite: Negative   Leuk Esterase: Negative / RBC: 0 /HPF / WBC 1 /HPF   Sq Epi: x / Non Sq Epi: x / Bacteria: Occasional /HPF    RADIOLOGY & ADDITIONAL STUDIES:  < from: CT Abdomen and Pelvis w/ Oral Cont and w/ IV Cont (24 @ 11:08) >  ACC: [id number] EXAM:  CT ABDOMEN AND PELVIS OC IC   ORDERED BY: SANTINO LANG     PROCEDURE DATE:  2024          INTERPRETATION:  CLINICAL INFORMATION: Abdominal pain, gastric sleeve    COMPARISON: CT chest abdomen and pelvis 10/22/2023    CONTRAST/COMPLICATIONS:  IV Contrast: Omnipaque 350  90 cc administered   10 cc discarded  Oral Contrast: Omnipaque 300  Complications: None reported at time of study completion    PROCEDURE:  CT of the Abdomen and Pelvis was performed.  Sagittal and coronal reformats were performed.    FINDINGS:  LOWER CHEST: Within normal limits.    LIVER: Hepatomegaly with hepatic steatosis.  BILE DUCTS: Normal caliber.  GALLBLADDER: Within normal limits.  SPLEEN: Within normal limits.  PANCREAS: Mildly edematous pancreatic parenchyma peripancreatic   infiltrative changes or peripancreatic fluid consistent with acute   pancreatitis.  ADRENALS: Within normal limits.  KIDNEYS/URETERS: Within normal limits.    BLADDER: Within normal limits.  REPRODUCTIVE ORGANS: Prostate within normal limits.    BOWEL: No bowel obstruction. Prior appendectomy. Prior gastric sleeve.   Clot diverticulosis without diverticulitis.  PERITONEUM: No ascites.  VESSELS: Within normal limits.  RETROPERITONEUM/LYMPH NODES: No lymphadenopathy.  ABDOMINAL WALL: Within normal limits.  BONES: Minimal degenerative changes.    IMPRESSION:  Findings compatible with acute pancreatitis.        --- End of Report ---            YULI QURESHI MD; Attending Radiologist  This document has been electronically signed. Mar  3 2024 11:22AM    < end of copied text >

## 2024-03-03 NOTE — ED PROVIDER NOTE - PHYSICAL EXAMINATION
Gen: appears uncomfortable   Head: NC/AT  Neck: trachea midline  cv: rrr  Resp:  No distress  abd tender in left mid and llq   Ext: no deformities  Neuro:  A&O appears non focal  Skin:  Warm and dry as visualized  Psych:  Normal affect and mood

## 2024-03-03 NOTE — ED ADULT NURSE REASSESSMENT NOTE - NS ED NURSE REASSESS COMMENT FT1
0901:  patient sitting on phone,  no longer moaning in pain, but states he does not feel all that much better.  he is consuming PO contrast.  awaiting urine sample.  urinal provided.  coretta agee

## 2024-03-03 NOTE — PATIENT PROFILE ADULT - WHEN WAS YOUR LAST VACCINATION? MONTH
October Date of Birth: [birth year]  Admission Weight (g): 607g    Admission Date and Time:  22 @ 16:49         Gestational Age: 26-6/7 wk     Source of admission [ __ ] Inborn     [X]Transport from [place]    Female infant born at 26wks via  to  mother due to severe preeclampsia. Prenatal labs RPR non-reactive, HBsAg -, Rubella immune, HIV -, GBS unknown.  Patient was intubated and surfactant administered, placed on vent, started on caffeine. Epoetin ramon was administered. Blood cultures were sent and ampicillin and gentamicin were given. Fluconazole (mg/kg/dose) one dose was given (on  at noon). On DOL 2, patient was noted to have increased O2 requirements, and received hydrocortisone and 2nd dose of surfactant was attempted. However, during a reintubation attempt in the setting of a "clogged ETT", presumed esophageal perforation occurred. Baby became bradycardic and received epinephrine, NS bolus, and sodium bicarbonate x3. No chest compressions were given. [facility] team requested transfer to [facility]. Transport team was notified and dispatched. On arrival of the Transport team at [facility], low MAPs and decreased SpO2 were noted; patient was on dopamine drip, s/p several epinephrine administrations, and hydrocortisone loading dose. Dopamine dosage was increased, patient reintubated and placed on transport vent, transferred in a heated incubator.    Patient arrived at [facility] 18:20 on . Surgery consulted for concern for esophageal perforation.      Social History: No history of alcohol/tobacco exposure obtained  FHx: non-contributory to the condition being treated or details of FH documented here  ROS: unable to obtain ()

## 2024-03-04 LAB
ALBUMIN SERPL ELPH-MCNC: 2.8 G/DL — LOW (ref 3.3–5)
ALP SERPL-CCNC: 51 U/L — SIGNIFICANT CHANGE UP (ref 40–120)
ALT FLD-CCNC: 85 U/L — HIGH (ref 12–78)
ANION GAP SERPL CALC-SCNC: 10 MMOL/L — SIGNIFICANT CHANGE UP (ref 5–17)
AST SERPL-CCNC: 60 U/L — HIGH (ref 15–37)
BILIRUB DIRECT SERPL-MCNC: 1 MG/DL — HIGH (ref 0–0.3)
BILIRUB INDIRECT FLD-MCNC: 1.1 MG/DL — HIGH (ref 0.2–1)
BILIRUB SERPL-MCNC: 2.1 MG/DL — HIGH (ref 0.2–1.2)
BILIRUB SERPL-MCNC: 2.1 MG/DL — HIGH (ref 0.2–1.2)
BUN SERPL-MCNC: 4 MG/DL — LOW (ref 7–23)
CALCIUM SERPL-MCNC: 8.5 MG/DL — SIGNIFICANT CHANGE UP (ref 8.5–10.1)
CHLORIDE SERPL-SCNC: 101 MMOL/L — SIGNIFICANT CHANGE UP (ref 96–108)
CO2 SERPL-SCNC: 28 MMOL/L — SIGNIFICANT CHANGE UP (ref 22–31)
CREAT SERPL-MCNC: 0.6 MG/DL — SIGNIFICANT CHANGE UP (ref 0.5–1.3)
EGFR: 126 ML/MIN/1.73M2 — SIGNIFICANT CHANGE UP
GLUCOSE SERPL-MCNC: 131 MG/DL — HIGH (ref 70–99)
HCT VFR BLD CALC: 43.4 % — SIGNIFICANT CHANGE UP (ref 39–50)
HGB BLD-MCNC: 15.2 G/DL — SIGNIFICANT CHANGE UP (ref 13–17)
LIDOCAIN IGE QN: 222 U/L — HIGH (ref 13–75)
MAGNESIUM SERPL-MCNC: 1.2 MG/DL — LOW (ref 1.6–2.6)
MCHC RBC-ENTMCNC: 35 GM/DL — SIGNIFICANT CHANGE UP (ref 32–36)
MCHC RBC-ENTMCNC: 35.2 PG — HIGH (ref 27–34)
MCV RBC AUTO: 100.5 FL — HIGH (ref 80–100)
NRBC # BLD: 0 /100 WBCS — SIGNIFICANT CHANGE UP (ref 0–0)
PHOSPHATE SERPL-MCNC: 1 MG/DL — CRITICAL LOW (ref 2.5–4.5)
PLATELET # BLD AUTO: 145 K/UL — LOW (ref 150–400)
POTASSIUM SERPL-MCNC: 3.1 MMOL/L — LOW (ref 3.5–5.3)
POTASSIUM SERPL-SCNC: 3.1 MMOL/L — LOW (ref 3.5–5.3)
PROT SERPL-MCNC: 6.3 G/DL — SIGNIFICANT CHANGE UP (ref 6–8.3)
RBC # BLD: 4.32 M/UL — SIGNIFICANT CHANGE UP (ref 4.2–5.8)
RBC # FLD: 12.3 % — SIGNIFICANT CHANGE UP (ref 10.3–14.5)
SODIUM SERPL-SCNC: 139 MMOL/L — SIGNIFICANT CHANGE UP (ref 135–145)
TRIGL SERPL-MCNC: 69 MG/DL — SIGNIFICANT CHANGE UP
WBC # BLD: 8.6 K/UL — SIGNIFICANT CHANGE UP (ref 3.8–10.5)
WBC # FLD AUTO: 8.6 K/UL — SIGNIFICANT CHANGE UP (ref 3.8–10.5)

## 2024-03-04 PROCEDURE — 99231 SBSQ HOSP IP/OBS SF/LOW 25: CPT

## 2024-03-04 PROCEDURE — 74183 MRI ABD W/O CNTR FLWD CNTR: CPT | Mod: 26

## 2024-03-04 RX ORDER — MAGNESIUM SULFATE 500 MG/ML
4 VIAL (ML) INJECTION ONCE
Refills: 0 | Status: DISCONTINUED | OUTPATIENT
Start: 2024-03-04 | End: 2024-03-04

## 2024-03-04 RX ORDER — LANOLIN ALCOHOL/MO/W.PET/CERES
5 CREAM (GRAM) TOPICAL AT BEDTIME
Refills: 0 | Status: DISCONTINUED | OUTPATIENT
Start: 2024-03-04 | End: 2024-03-05

## 2024-03-04 RX ORDER — MAGNESIUM SULFATE 500 MG/ML
2 VIAL (ML) INJECTION
Refills: 0 | Status: COMPLETED | OUTPATIENT
Start: 2024-03-04 | End: 2024-03-04

## 2024-03-04 RX ORDER — POTASSIUM PHOSPHATE, MONOBASIC POTASSIUM PHOSPHATE, DIBASIC 236; 224 MG/ML; MG/ML
30 INJECTION, SOLUTION INTRAVENOUS ONCE
Refills: 0 | Status: COMPLETED | OUTPATIENT
Start: 2024-03-04 | End: 2024-03-04

## 2024-03-04 RX ADMIN — POTASSIUM PHOSPHATE, MONOBASIC POTASSIUM PHOSPHATE, DIBASIC 83.33 MILLIMOLE(S): 236; 224 INJECTION, SOLUTION INTRAVENOUS at 12:49

## 2024-03-04 RX ADMIN — Medication 3 MILLIGRAM(S): at 00:02

## 2024-03-04 RX ADMIN — Medication 1 MILLIGRAM(S): at 11:43

## 2024-03-04 RX ADMIN — Medication 30 MILLIGRAM(S): at 11:43

## 2024-03-04 RX ADMIN — Medication 100 MILLIGRAM(S): at 11:43

## 2024-03-04 RX ADMIN — Medication 650 MILLIGRAM(S): at 08:01

## 2024-03-04 RX ADMIN — Medication 1 TABLET(S): at 11:43

## 2024-03-04 RX ADMIN — Medication 5 MILLIGRAM(S): at 21:11

## 2024-03-04 RX ADMIN — Medication 25 GRAM(S): at 16:56

## 2024-03-04 RX ADMIN — Medication 25 GRAM(S): at 13:09

## 2024-03-04 RX ADMIN — Medication 650 MILLIGRAM(S): at 09:00

## 2024-03-04 NOTE — CONSULT NOTE ADULT - SUBJECTIVE AND OBJECTIVE BOX
[facility]  Natan Douglass PA-C  [address] [place] [id number] [phone]      Chief Complaint:  Patient is a 39y old  Male who presents with a chief complaint of     HPI:39M with hx of etoh pancreatitis, gastric sleeve presenting with abdominal pain. Pt reports he drinks "2 tall boys" 4-5x/week. Denies withdrawing in the past. He cut down on drinking since having pancreatitis in september. He developed severe abdominal pain yesterday. Pain is sharp epigastric and radiates to the back. associated with anorexia but no n/v.     Allergies:  No Known Allergies      Medications:  acetaminophen     Tablet .. 650 milliGRAM(s) Oral every 6 hours PRN  aluminum hydroxide/magnesium hydroxide/simethicone Suspension 30 milliLiter(s) Oral every 4 hours PRN  FLUoxetine 30 milliGRAM(s) Oral daily  folic acid 1 milliGRAM(s) Oral daily  lactated ringers. 1000 milliLiter(s) IV Continuous <Continuous>  LORazepam     Tablet 2 milliGRAM(s) Oral every 2 hours PRN  LORazepam     Tablet 2 milliGRAM(s) Oral every 1 hour PRN  magnesium sulfate  IVPB 2 Gram(s) IV Intermittent every 2 hours  melatonin 3 milliGRAM(s) Oral at bedtime PRN  morphine  - Injectable 4 milliGRAM(s) IV Push every 4 hours PRN  multivitamin 1 Tablet(s) Oral daily  ondansetron Injectable 4 milliGRAM(s) IV Push every 8 hours PRN  thiamine 100 milliGRAM(s) Oral daily      PMHX/PSHX:  H/O pyloric stenosis    Anxiety    Pancreatitis    Pneumonia, aspiration    History of repair of pyloric stenosis    History of excision of pilonidal cyst    S/P appendectomy    H/O bariatric surgery        Family history:  No pertinent family history in first degree relatives        Social History: + etoh use 5 days a week (2; 22oz cans of beer or hard seltzer)    ROS:     General:  no fevers, chills, night sweats, fatigue,   Eyes:  Good vision, no reported pain  ENT:  No sore throat, pain, runny nose, dysphagia  CV:  No pain, palpitations, hypo/hypertension  Resp:  No dyspnea, cough, tachypnea, wheezing  GI:  + pain, No nausea, No vomiting, No diarrhea, No constipation, No weight loss, No fever, No pruritis, No rectal bleeding, No tarry stools, No dysphagia,  :  No pain, bleeding, incontinence, nocturia  Muscle:  No pain, weakness  Neuro:  No weakness, tingling, memory problems  Psych:  No fatigue, insomnia, mood problems, depression  Endocrine:  No polyuria, polydipsia, cold/heat intolerance  Heme:  No petechiae, ecchymosis, easy bruisability  Skin:  No rash, tattoos, scars, edema      PHYSICAL EXAM:   Vital Signs:  Vital Signs Last 24 Hrs  T(C): 37.7 (04 Mar 2024 12:06), Max: 37.7 (04 Mar 2024 12:06)  T(F): 99.8 (04 Mar 2024 12:06), Max: 99.8 (04 Mar 2024 12:06)  HR: 89 (04 Mar 2024 12:06) (72 - 92)  BP: 133/95 (04 Mar 2024 12:06) (119/79 - 138/86)  BP(mean): --  RR: 18 (04 Mar 2024 12:06) (17 - 18)  SpO2: 98% (04 Mar 2024 12:06) (94% - 98%)    Parameters below as of 04 Mar 2024 12:06  Patient On (Oxygen Delivery Method): room air      Daily     Daily     GENERAL:  Appears stated age,   HEENT:  NC/AT,    CHEST:  Full & symmetric excursion,   HEART:  Regular rhythm  ABDOMEN:  Soft, non-tender, non-distended,   EXTEREMITIES:  no cyanosis,clubbing or edema  SKIN:  No rash  NEURO:  Alert,    LABS:                        15.2   8.60  )-----------( 145      ( 04 Mar 2024 09:00 )             43.4     03-04    139  |  101  |  4<L>  ----------------------------<  131<H>  3.1<L>   |  28  |  0.60    Ca    8.5      04 Mar 2024 09:00  Phos  1.0     03-04  Mg     1.2     03-04    TPro  6.3  /  Alb  2.8<L>  /  TBili  2.1<H>  /  DBili  1.0<H>  /  AST  60<H>  /  ALT  85<H>  /  AlkPhos  51  03-04    LIVER FUNCTIONS - ( 04 Mar 2024 09:00 )  Alb: 2.8 g/dL / Pro: 6.3 g/dL / ALK PHOS: 51 U/L / ALT: 85 U/L / AST: 60 U/L / GGT: x             Urinalysis Basic - ( 04 Mar 2024 09:00 )    Color: x / Appearance: x / SG: x / pH: x  Gluc: 131 mg/dL / Ketone: x  / Bili: x / Urobili: x   Blood: x / Protein: x / Nitrite: x   Leuk Esterase: x / RBC: x / WBC x   Sq Epi: x / Non Sq Epi: x / Bacteria: x      Amylase Serum--      Lipase [id number]       Ammonia--      Imaging:

## 2024-03-04 NOTE — PROGRESS NOTE ADULT - SUBJECTIVE AND OBJECTIVE BOX
pt seen  ICU Vital Signs Last 24 Hrs  T(C): 37 (04 Mar 2024 05:51), Max: 37 (04 Mar 2024 05:51)  T(F): 98.6 (04 Mar 2024 05:51), Max: 98.6 (04 Mar 2024 05:51)  HR: 72 (04 Mar 2024 05:51) (72 - 92)  BP: 122/82 (04 Mar 2024 05:51) (119/79 - 138/86)  BP(mean): --  ABP: --  ABP(mean): --  RR: 18 (04 Mar 2024 05:51) (17 - 18)  SpO2: 97% (04 Mar 2024 05:51) (94% - 98%)    O2 Parameters below as of 04 Mar 2024 05:51  Patient On (Oxygen Delivery Method): room air        NAD  CTA b/l  soft ND, tender epigastric                          15.2   8.60  )-----------( 145      ( 04 Mar 2024 09:00 )             43.4   03-04    139  |  101  |  4<L>  ----------------------------<  131<H>  3.1<L>   |  28  |  0.60    Ca    8.5      04 Mar 2024 09:00  Phos  1.0     03-04  Mg     1.2     03-04    TPro  6.3  /  Alb  2.8<L>  /  TBili  2.1<H>  /  DBili  x   /  AST  60<H>  /  ALT  85<H>  /  AlkPhos  51  03-04

## 2024-03-04 NOTE — PROGRESS NOTE ADULT - ASSESSMENT
Pancreatitis  will get MRCP to further evaluate poss cause
39M presenting with acute pancreatitis  likely etoh  lfts elevated likely due to etoh  no biliary disease on ct, US or MRCP  pain is controlled  ADAT  GI and surgery following

## 2024-03-04 NOTE — CONSULT NOTE ADULT - ASSESSMENT
pancreatitis    suspect etoh induced  no gallstones on imaging  MRCP pending per surgery  check lipid profile  advance to clears post MRI  etoh cessation discussed  will follow 
38 yo male with past medical history of pancreatitis, s/p LSG, appendectomy, presenting with a one day history of acute onset severe abdominal pain similar to his previous episode of pancreatitis. CT scan with findings of acute pancreatitis (?gallstone), labs elevated T. bili, LFTs, lipase and lactate.    Plan:  - admit to medicine  - NPO/IVF  - MRCP  - trend labs/vitals  - analgesics PRN  - CIWA  - rest of care per primary team

## 2024-03-04 NOTE — PROGRESS NOTE ADULT - SUBJECTIVE AND OBJECTIVE BOX
Patient is a 39y old  Male who presents with a chief complaint of       INTERVAL HPI/OVERNIGHT EVENTS:   no complaints  pt seen and examined         Vital Signs Last 24 Hrs  T(C): 36.9 (04 Mar 2024 20:32), Max: 37.7 (04 Mar 2024 12:06)  T(F): 98.4 (04 Mar 2024 20:32), Max: 99.8 (04 Mar 2024 12:06)  HR: 89 (04 Mar 2024 20:32) (72 - 89)  BP: 132/92 (04 Mar 2024 20:32) (122/82 - 133/95)  BP(mean): --  RR: 18 (04 Mar 2024 20:32) (18 - 18)  SpO2: 98% (04 Mar 2024 20:32) (97% - 98%)    Parameters below as of 04 Mar 2024 20:32  Patient On (Oxygen Delivery Method): room air        acetaminophen     Tablet .. 650 milliGRAM(s) Oral every 6 hours PRN  aluminum hydroxide/magnesium hydroxide/simethicone Suspension 30 milliLiter(s) Oral every 4 hours PRN  FLUoxetine 30 milliGRAM(s) Oral daily  folic acid 1 milliGRAM(s) Oral daily  LORazepam     Tablet 2 milliGRAM(s) Oral every 2 hours PRN  LORazepam     Tablet 2 milliGRAM(s) Oral every 1 hour PRN  melatonin 5 milliGRAM(s) Oral at bedtime  morphine  - Injectable 4 milliGRAM(s) IV Push every 4 hours PRN  multivitamin 1 Tablet(s) Oral daily  ondansetron Injectable 4 milliGRAM(s) IV Push every 8 hours PRN  thiamine 100 milliGRAM(s) Oral daily      PHYSICAL EXAM:  GENERAL: NAD   EYES: conjunctiva and sclera clear  ENMT: Moist mucous membranes  NECK: Supple, No JVD, Normal thyroid  CHEST/LUNG: non labored, cta b/l  HEART: Regular rate and rhythm; No murmurs, rubs, or gallops  ABDOMEN: Soft, Nontender, Nondistended; Bowel sounds present  EXTREMITIES:  2+ Peripheral Pulses, No clubbing, no cyanosis, no edema  LYMPH: No lymphadenopathy noted  SKIN: No rashes or lesions  NEURO: no focal deficits    Consultant(s) Notes Reviewed:  [x ] YES  [ ] NO  Care Discussed with Consultants/Other Providers [ x] YES  [ ] NO    LABS:                        15.2   8.60  )-----------( 145      ( 04 Mar 2024 09:00 )             43.4     03-04    139  |  101  |  4<L>  ----------------------------<  131<H>  3.1<L>   |  28  |  0.60    Ca    8.5      04 Mar 2024 09:00  Phos  1.0     03-04  Mg     1.2     03-04    TPro  6.3  /  Alb  2.8<L>  /  TBili  2.1<H>  /  DBili  1.0<H>  /  AST  60<H>  /  ALT  85<H>  /  AlkPhos  51  03-04      Urinalysis Basic - ( 04 Mar 2024 09:00 )    Color: x / Appearance: x / SG: x / pH: x  Gluc: 131 mg/dL / Ketone: x  / Bili: x / Urobili: x   Blood: x / Protein: x / Nitrite: x   Leuk Esterase: x / RBC: x / WBC x   Sq Epi: x / Non Sq Epi: x / Bacteria: x      CAPILLARY BLOOD GLUCOSE            Urinalysis Basic - ( 04 Mar 2024 09:00 )    Color: x / Appearance: x / SG: x / pH: x  Gluc: 131 mg/dL / Ketone: x  / Bili: x / Urobili: x   Blood: x / Protein: x / Nitrite: x   Leuk Esterase: x / RBC: x / WBC x   Sq Epi: x / Non Sq Epi: x / Bacteria: x          RADIOLOGY & ADDITIONAL TESTS:    Imaging Personally Reviewed  Reviewed consultants input

## 2024-03-05 ENCOUNTER — TRANSCRIPTION ENCOUNTER (OUTPATIENT)
Age: 39
End: 2024-03-05

## 2024-03-05 VITALS
RESPIRATION RATE: 17 BRPM | SYSTOLIC BLOOD PRESSURE: 117 MMHG | DIASTOLIC BLOOD PRESSURE: 86 MMHG | HEART RATE: 71 BPM | OXYGEN SATURATION: 96 % | TEMPERATURE: 98 F

## 2024-03-05 DIAGNOSIS — F10.20 ALCOHOL DEPENDENCE, UNCOMPLICATED: ICD-10-CM

## 2024-03-05 LAB
ALBUMIN SERPL ELPH-MCNC: 2.5 G/DL — LOW (ref 3.3–5)
ALP SERPL-CCNC: 55 U/L — SIGNIFICANT CHANGE UP (ref 40–120)
ALT FLD-CCNC: 62 U/L — SIGNIFICANT CHANGE UP (ref 12–78)
ANION GAP SERPL CALC-SCNC: 8 MMOL/L — SIGNIFICANT CHANGE UP (ref 5–17)
AST SERPL-CCNC: 42 U/L — HIGH (ref 15–37)
BASOPHILS # BLD AUTO: 0.01 K/UL — SIGNIFICANT CHANGE UP (ref 0–0.2)
BASOPHILS NFR BLD AUTO: 0.1 % — SIGNIFICANT CHANGE UP (ref 0–2)
BILIRUB DIRECT SERPL-MCNC: 0.9 MG/DL — HIGH (ref 0–0.3)
BILIRUB INDIRECT FLD-MCNC: 1.1 MG/DL — HIGH (ref 0.2–1)
BILIRUB SERPL-MCNC: 2 MG/DL — HIGH (ref 0.2–1.2)
BILIRUB SERPL-MCNC: 2 MG/DL — HIGH (ref 0.2–1.2)
BUN SERPL-MCNC: 5 MG/DL — LOW (ref 7–23)
CALCIUM SERPL-MCNC: 8.5 MG/DL — SIGNIFICANT CHANGE UP (ref 8.5–10.1)
CHLORIDE SERPL-SCNC: 106 MMOL/L — SIGNIFICANT CHANGE UP (ref 96–108)
CO2 SERPL-SCNC: 29 MMOL/L — SIGNIFICANT CHANGE UP (ref 22–31)
CREAT SERPL-MCNC: 0.56 MG/DL — SIGNIFICANT CHANGE UP (ref 0.5–1.3)
EGFR: 129 ML/MIN/1.73M2 — SIGNIFICANT CHANGE UP
EOSINOPHIL # BLD AUTO: 0.12 K/UL — SIGNIFICANT CHANGE UP (ref 0–0.5)
EOSINOPHIL NFR BLD AUTO: 1.8 % — SIGNIFICANT CHANGE UP (ref 0–6)
GLUCOSE SERPL-MCNC: 94 MG/DL — SIGNIFICANT CHANGE UP (ref 70–99)
HCT VFR BLD CALC: 43.3 % — SIGNIFICANT CHANGE UP (ref 39–50)
HGB BLD-MCNC: 15.2 G/DL — SIGNIFICANT CHANGE UP (ref 13–17)
IMM GRANULOCYTES NFR BLD AUTO: 0.1 % — SIGNIFICANT CHANGE UP (ref 0–0.9)
LYMPHOCYTES # BLD AUTO: 1.13 K/UL — SIGNIFICANT CHANGE UP (ref 1–3.3)
LYMPHOCYTES # BLD AUTO: 16.8 % — SIGNIFICANT CHANGE UP (ref 13–44)
MAGNESIUM SERPL-MCNC: 2 MG/DL — SIGNIFICANT CHANGE UP (ref 1.6–2.6)
MCHC RBC-ENTMCNC: 35.1 GM/DL — SIGNIFICANT CHANGE UP (ref 32–36)
MCHC RBC-ENTMCNC: 35.5 PG — HIGH (ref 27–34)
MCV RBC AUTO: 101.2 FL — HIGH (ref 80–100)
MONOCYTES # BLD AUTO: 0.68 K/UL — SIGNIFICANT CHANGE UP (ref 0–0.9)
MONOCYTES NFR BLD AUTO: 10.1 % — SIGNIFICANT CHANGE UP (ref 2–14)
NEUTROPHILS # BLD AUTO: 4.76 K/UL — SIGNIFICANT CHANGE UP (ref 1.8–7.4)
NEUTROPHILS NFR BLD AUTO: 71.1 % — SIGNIFICANT CHANGE UP (ref 43–77)
NRBC # BLD: 0 /100 WBCS — SIGNIFICANT CHANGE UP (ref 0–0)
PHOSPHATE SERPL-MCNC: 2.6 MG/DL — SIGNIFICANT CHANGE UP (ref 2.5–4.5)
PLATELET # BLD AUTO: 111 K/UL — LOW (ref 150–400)
POTASSIUM SERPL-MCNC: 3.2 MMOL/L — LOW (ref 3.5–5.3)
POTASSIUM SERPL-SCNC: 3.2 MMOL/L — LOW (ref 3.5–5.3)
PROT SERPL-MCNC: 6.3 G/DL — SIGNIFICANT CHANGE UP (ref 6–8.3)
RBC # BLD: 4.28 M/UL — SIGNIFICANT CHANGE UP (ref 4.2–5.8)
RBC # FLD: 12.4 % — SIGNIFICANT CHANGE UP (ref 10.3–14.5)
SODIUM SERPL-SCNC: 143 MMOL/L — SIGNIFICANT CHANGE UP (ref 135–145)
WBC # BLD: 6.71 K/UL — SIGNIFICANT CHANGE UP (ref 3.8–10.5)
WBC # FLD AUTO: 6.71 K/UL — SIGNIFICANT CHANGE UP (ref 3.8–10.5)

## 2024-03-05 PROCEDURE — 96375 TX/PRO/DX INJ NEW DRUG ADDON: CPT

## 2024-03-05 PROCEDURE — 85027 COMPLETE CBC AUTOMATED: CPT

## 2024-03-05 PROCEDURE — 84478 ASSAY OF TRIGLYCERIDES: CPT

## 2024-03-05 PROCEDURE — 81001 URINALYSIS AUTO W/SCOPE: CPT

## 2024-03-05 PROCEDURE — 83605 ASSAY OF LACTIC ACID: CPT

## 2024-03-05 PROCEDURE — 76705 ECHO EXAM OF ABDOMEN: CPT

## 2024-03-05 PROCEDURE — 80053 COMPREHEN METABOLIC PANEL: CPT

## 2024-03-05 PROCEDURE — 85025 COMPLETE CBC W/AUTO DIFF WBC: CPT

## 2024-03-05 PROCEDURE — 82247 BILIRUBIN TOTAL: CPT

## 2024-03-05 PROCEDURE — A9579: CPT

## 2024-03-05 PROCEDURE — 36415 COLL VENOUS BLD VENIPUNCTURE: CPT

## 2024-03-05 PROCEDURE — 99285 EMERGENCY DEPT VISIT HI MDM: CPT | Mod: 25

## 2024-03-05 PROCEDURE — 84100 ASSAY OF PHOSPHORUS: CPT

## 2024-03-05 PROCEDURE — 74183 MRI ABD W/O CNTR FLWD CNTR: CPT | Mod: MC

## 2024-03-05 PROCEDURE — 83690 ASSAY OF LIPASE: CPT

## 2024-03-05 PROCEDURE — 82248 BILIRUBIN DIRECT: CPT

## 2024-03-05 PROCEDURE — 83735 ASSAY OF MAGNESIUM: CPT

## 2024-03-05 PROCEDURE — 74177 CT ABD & PELVIS W/CONTRAST: CPT | Mod: MC

## 2024-03-05 PROCEDURE — 80048 BASIC METABOLIC PNL TOTAL CA: CPT

## 2024-03-05 PROCEDURE — 96374 THER/PROPH/DIAG INJ IV PUSH: CPT

## 2024-03-05 RX ORDER — POTASSIUM CHLORIDE 20 MEQ
40 PACKET (EA) ORAL EVERY 4 HOURS
Refills: 0 | Status: COMPLETED | OUTPATIENT
Start: 2024-03-05 | End: 2024-03-05

## 2024-03-05 RX ADMIN — Medication 40 MILLIEQUIVALENT(S): at 10:19

## 2024-03-05 RX ADMIN — Medication 1 TABLET(S): at 11:41

## 2024-03-05 RX ADMIN — Medication 30 MILLIGRAM(S): at 11:41

## 2024-03-05 RX ADMIN — Medication 40 MILLIEQUIVALENT(S): at 14:06

## 2024-03-05 RX ADMIN — Medication 100 MILLIGRAM(S): at 11:41

## 2024-03-05 RX ADMIN — Medication 1 MILLIGRAM(S): at 11:41

## 2024-03-05 NOTE — PROGRESS NOTE ADULT - NSPROGADDITIONALINFOA_GEN_ALL_CORE
agree with above unless contradicts below  MRCP negative  adina reg diet  for d/c   f/u outpt for further workup  rec: etoh cessation

## 2024-03-05 NOTE — CARE COORDINATION ASSESSMENT. - OTHER PERTINENT DISCHARGE PLANNING INFORMATION:
Pt admitted with ETOH pancreatitis with h/o gastric sleeve. He lives with his mother, sister, brother in law and sister's 5 yr old child. Pt is employed as a  and admits to drinking 4-5 days a week 2 24 oz tall boys a day. He stated that he sees a SW weekly through telehealth and declined Tx options.

## 2024-03-05 NOTE — DISCHARGE NOTE PROVIDER - NSDCMRMEDTOKEN_GEN_ALL_CORE_FT
calcium (as calcium citrate) 500 mg oral tablet, effervescent: 1 tab(s) orally once a day  PROzac 10 mg oral tablet: 3 tab(s) orally once a day

## 2024-03-05 NOTE — CARE COORDINATION ASSESSMENT. - NSPASTMEDSURGHISTORY_GEN_ALL_CORE_FT
PAST MEDICAL & SURGICAL HISTORY:  Anxiety      H/O pyloric stenosis      History of excision of pilonidal cyst      History of repair of pyloric stenosis      Pneumonia, aspiration      Pancreatitis      H/O bariatric surgery  octomber 2021      S/P appendectomy  september 2021

## 2024-03-05 NOTE — DISCHARGE NOTE NURSING/CASE MANAGEMENT/SOCIAL WORK - PATIENT PORTAL LINK FT
You can access the FollowMyHealth Patient Portal offered by [facility] by registering at the following website: [email or web address]. By joining [employer]’s FollowMyHealth portal, you will also be able to view your health information using other applications (apps) compatible with our system.

## 2024-03-05 NOTE — DISCHARGE NOTE PROVIDER - HOSPITAL COURSE
39M with hx of etoh pancreatitis, gastric sleeve presenting with abdominal pain. Pt reports he drinks "2 tall boys" 4-5x/week. Denies withdrawing in the past. He cut down on drinking since having pancreatitis in september. He developed severe abdominal pain yesterday. Pain is sharp epigastric and radiates to the back. associated with anorexia but no n/v. Pt was admitted and treated for pancreatitis. He was observed on ciwa protocol but did not require any benzos. He was seen by SW, GI, and surgery. He was ruled out for gallstones with CT, US, and MRCP. he was kept npo and hydrated with quick improvement. He was supplied with resources for etoh treatment  LABS:                      15.2   6.71  )-----------( 111      ( 05 Mar 2024 05:07 )             43.3   03-05  143  |  106  |  5<L>  ----------------------------<  94  3.2<L>   |  29  |  0.56  Ca    8.5      05 Mar 2024 05:07  Phos  2.6     03-05  Mg     2.0     03-05  TPro  6.3  /  Alb  2.5<L>  /  TBili  2.0<H>  /  DBili  0.9<H>  /  AST  42<H>  /  ALT  62  /  AlkPhos  55  03-05  RADIOLOGY & ADDITIONAL TESTS:  < from: MR MRCP w/wo IV Cont (03.04.24 @ 15:08) >    LIVER: Steatosis.  BILE DUCTS: Normal caliber. No choledocholithiasis.  GALLBLADDER: Within normal limits.  SPLEEN: Within normal limits.  PANCREAS: Mild peripancreatic inflammation and fluid.  ADRENALS: Within normal limits.  KIDNEYS/URETERS: Tiny left renal cyst. No hydronephrosis.    VISUALIZED PORTIONS:  BOWEL: Negative gastrectomy. Scattered colonic diverticula.  PERITONEUM: No ascites.  VESSELS: Within normal limits.  RETROPERITONEUM/LYMPH NODES: No lymphadenopathy.  ABDOMINAL WALL: Within normal limits.  BONES: Within normal limits.    IMPRESSION:  No cholelithiasis, biliary ductal dilatation, or choledocholithiasis.  Hepatic steatosis.  Pancreatitis.    < end of copied text >    < from: US Abdomen Upper Quadrant Right (03.03.24 @ 21:01) >    FINDINGS: This study was technically difficult due to patient's inability   to cooperate and bowel gas.    LIVER: 18.7 cm. Increased echogenicity, likely fatty infiltration.  BILIARY: No intrahepatic or extrahepatic biliary dilatation. Visualized   common bile duct measuring up to 0.2 cm.  GALLBLADDER: No obvious stone, wall thickening or pericholecystic fluid.   Negative sonographic Carmichael sign. It is unknown whether the patient was   medicated.  PANCREAS: There visualized due to bowel gas.  RIGHT KIDNEY: 10.2 cm. No hydronephrosis.  ADDITIONAL: No ascites.    IMPRESSION:    No obvious cholelithiasis or sonographic evidence of acute cholecystitis.    < end of copied text >    < from: CT Abdomen and Pelvis w/ Oral Cont and w/ IV Cont (03.03.24 @ 11:08) >    LOWER CHEST: Within normal limits.    LIVER: Hepatomegaly with hepatic steatosis.  BILE DUCTS: Normal caliber.  GALLBLADDER: Within normal limits.  SPLEEN: Within normal limits.  PANCREAS: Mildly edematous pancreatic parenchyma peripancreatic   infiltrative changes or peripancreatic fluid consistent with acute   pancreatitis.  ADRENALS: Within normal limits.  KIDNEYS/URETERS: Within normal limits.    BLADDER: Within normal limits.  REPRODUCTIVE ORGANS: Prostate within normal limits.    BOWEL: No bowel obstruction. Prior appendectomy. Prior gastric sleeve.   Clot diverticulosis without diverticulitis.  PERITONEUM: No ascites.  VESSELS: Within normal limits.  RETROPERITONEUM/LYMPH NODES: No lymphadenopathy.  ABDOMINAL WALL: Within normal limits.  BONES: Minimal degenerative changes.    IMPRESSION:  Findings compatible with acute pancreatitis.    < end of copied text >

## 2024-03-05 NOTE — DISCHARGE NOTE NURSING/CASE MANAGEMENT/SOCIAL WORK - NSDCPEFALRISK_GEN_ALL_CORE
For information on Fall & Injury Prevention, visit: [email or web address] OR  [email or web address] OR  https://www.cdc.gov/steadi/patient.html

## 2024-03-05 NOTE — CARE COORDINATION ASSESSMENT. - NSCAREPROVIDERS_GEN_ALL_CORE_FT
CARE PROVIDERS:  Accepting Physician: Jared Valdez  Access Services: Sophia Kim  Administration: Tez Frank  Admitting: Jared Valdez  Attending: Jared Valdez  Case Management: Amy Orozco  Case Management: Jc Gusman  Consultant: Juwan Boyce  Consultant: Martin Luo  Consultant: Corey Wharton  Consultant: Khan, Fahrina  Consultant: Amy Gold  Consultant: Gideon Murrieta  ED Attending: Cassidy Cr  ED Nurse: Rosa Love  ED Nurse 2: Tez Alvarez  Nurse: Claudine Gannon  Nurse: Chralotte Massey  Nurse: Sabra Toure  Ordered: ADM, User  Ordered: Doctor, Unknown  Outpatient Provider: Crissy Plaza  Override: Marianne Webb  Override: Yee Lindquist  PCA/Nursing Assistant: Veda Garcia  Primary Team: Mihaela Davis  : Diane Mcclellan  : Lucie Davis

## 2024-03-05 NOTE — PROGRESS NOTE ADULT - SUBJECTIVE AND OBJECTIVE BOX
SUBJECTIVE:  Patient seen and examined at bedside. No overnight events. Patient reports no new complaints at this time. Tolerating full liquid diet. Admits to flatus and BM. Voiding, ambulating  Patient denies any fever, chills, chest pain, shortness of breath, nausea, vomiting, or urinary complaints.    VITALS  Vital Signs Last 24 Hrs  T(C): 37.1 (05 Mar 2024 05:13), Max: 37.7 (04 Mar 2024 12:06)  T(F): 98.7 (05 Mar 2024 05:13), Max: 99.8 (04 Mar 2024 12:06)  HR: 72 (05 Mar 2024 05:13) (72 - 89)  BP: 122/86 (05 Mar 2024 05:13) (122/86 - 133/95)  BP(mean): --  RR: 17 (05 Mar 2024 05:13) (17 - 18)  SpO2: 97% (05 Mar 2024 05:13) (97% - 98%)    Parameters below as of 05 Mar 2024 05:13  Patient On (Oxygen Delivery Method): room air    PHYSICAL EXAM  GENERAL: Well-developed Male lying comfortably in bed in [facility].  HEENT:  NC/AT. Sclera white. Mucous membranes moist.  ABDOMEN:  Soft, nondistended, nontender; No rebound tenderness or guarding.  SKIN:  No jaundice, pallor, or cyanosis  NEURO:  A&O x 3    MEDICATIONS  MEDICATIONS  (STANDING):  FLUoxetine 30 milliGRAM(s) Oral daily  folic acid 1 milliGRAM(s) Oral daily  melatonin 5 milliGRAM(s) Oral at bedtime  multivitamin 1 Tablet(s) Oral daily  potassium chloride    Tablet ER 40 milliEquivalent(s) Oral every 4 hours  thiamine 100 milliGRAM(s) Oral daily    MEDICATIONS  (PRN):  acetaminophen     Tablet .. 650 milliGRAM(s) Oral every 6 hours PRN Temp greater or equal to 38C (100.4F), Mild Pain (1 - 3)  aluminum hydroxide/magnesium hydroxide/simethicone Suspension 30 milliLiter(s) Oral every 4 hours PRN Dyspepsia  LORazepam     Tablet 2 milliGRAM(s) Oral every 1 hour PRN CIWA-Ar score 8 or greater  LORazepam     Tablet 2 milliGRAM(s) Oral every 2 hours PRN CIWA-Ar score increase by 2 points and a total score of 7 or less  morphine  - Injectable 4 milliGRAM(s) IV Push every 4 hours PRN Severe Pain (7 - 10)  ondansetron Injectable 4 milliGRAM(s) IV Push every 8 hours PRN Nausea and/or Vomiting    LABS:             15.2   6.71  )-----------( 111      ( 05 Mar 2024 05:07 )             43.3     03-05    143  |  106  |  5<L>  ----------------------------<  94  3.2<L>   |  29  |  0.56    Ca    8.5      05 Mar 2024 05:07  Phos  2.6     03-05  Mg     2.0     03-05    TPro  6.3  /  Alb  2.5<L>  /  TBili  2.0<H>  /  DBili  x   /  AST  42<H>  /  ALT  62  /  AlkPhos  55  03-05    RADIOLOGY & ADDITIONAL STUDIES:  ACC: [id number] EXAM:  MR MRCP WAW IC   ORDERED BY: JEFF ORTIZ   PROCEDURE DATE:  03/04/2024    COMPARISON: CT abdomen ultrasound dated 3/3/2024    FINDINGS:  LOWER CHEST: Basilar atelectasis.    LIVER: Steatosis.  BILE DUCTS: Normal caliber. No choledocholithiasis.  GALLBLADDER: Within normal limits.  SPLEEN: Within normal limits.  PANCREAS: Mild peripancreatic inflammation and fluid.  ADRENALS: Within normal limits.  KIDNEYS/URETERS: Tiny left renal cyst. No hydronephrosis.    VISUALIZED PORTIONS:  BOWEL: Negative gastrectomy. Scattered colonic diverticula.  PERITONEUM: No ascites.  VESSELS: Within normal limits.  RETROPERITONEUM/LYMPH NODES: No lymphadenopathy.  ABDOMINAL WALL: Within normal limits.  BONES: Within normal limits.    IMPRESSION:  No cholelithiasis, biliary ductal dilatation, or choledocholithiasis.  Hepatic steatosis.  Pancreatitis.    --- End of Report ---  JOSÉ LUIS GLASS MD; Attending Radiologist    ASSESSMENT & PLAN  40 yo male w/ PMH of pancreatitis, s/p LSG, appendectomy, a/w pancreatitis. MRCP w/w/o IV contrast yesterday negative for cholelithiasis, choledocho, nonsuggestive for biliary obstruction.  Pt endorses significant improvement in abdominal pain. Tolerating full liquid diet.      Plan:  - Continue diet  - MRCP  - trend labs/vitals  - analgesics PRN  - CIWA  - rest of care per primary team    SUBJECTIVE:  Patient seen and examined at bedside. No overnight events. Patient reports no new complaints at this time. Tolerating full liquid diet. Admits to flatus and BM. Voiding, ambulating  Patient denies any fever, chills, chest pain, shortness of breath, nausea, vomiting, or urinary complaints.    VITALS  Vital Signs Last 24 Hrs  T(C): 37.1 (05 Mar 2024 05:13), Max: 37.7 (04 Mar 2024 12:06)  T(F): 98.7 (05 Mar 2024 05:13), Max: 99.8 (04 Mar 2024 12:06)  HR: 72 (05 Mar 2024 05:13) (72 - 89)  BP: 122/86 (05 Mar 2024 05:13) (122/86 - 133/95)  BP(mean): --  RR: 17 (05 Mar 2024 05:13) (17 - 18)  SpO2: 97% (05 Mar 2024 05:13) (97% - 98%)    Parameters below as of 05 Mar 2024 05:13  Patient On (Oxygen Delivery Method): room air    PHYSICAL EXAM  GENERAL: Well-developed Male lying comfortably in bed in [facility].  HEENT:  NC/AT. Sclera white. Mucous membranes moist.  ABDOMEN:  Soft, nondistended, nontender; No rebound tenderness or guarding.  SKIN:  No jaundice, pallor, or cyanosis  NEURO:  A&O x 3    MEDICATIONS  MEDICATIONS  (STANDING):  FLUoxetine 30 milliGRAM(s) Oral daily  folic acid 1 milliGRAM(s) Oral daily  melatonin 5 milliGRAM(s) Oral at bedtime  multivitamin 1 Tablet(s) Oral daily  potassium chloride    Tablet ER 40 milliEquivalent(s) Oral every 4 hours  thiamine 100 milliGRAM(s) Oral daily    MEDICATIONS  (PRN):  acetaminophen     Tablet .. 650 milliGRAM(s) Oral every 6 hours PRN Temp greater or equal to 38C (100.4F), Mild Pain (1 - 3)  aluminum hydroxide/magnesium hydroxide/simethicone Suspension 30 milliLiter(s) Oral every 4 hours PRN Dyspepsia  LORazepam     Tablet 2 milliGRAM(s) Oral every 1 hour PRN CIWA-Ar score 8 or greater  LORazepam     Tablet 2 milliGRAM(s) Oral every 2 hours PRN CIWA-Ar score increase by 2 points and a total score of 7 or less  morphine  - Injectable 4 milliGRAM(s) IV Push every 4 hours PRN Severe Pain (7 - 10)  ondansetron Injectable 4 milliGRAM(s) IV Push every 8 hours PRN Nausea and/or Vomiting    LABS:             15.2   6.71  )-----------( 111      ( 05 Mar 2024 05:07 )             43.3     03-05    143  |  106  |  5<L>  ----------------------------<  94  3.2<L>   |  29  |  0.56    Ca    8.5      05 Mar 2024 05:07  Phos  2.6     03-05  Mg     2.0     03-05    TPro  6.3  /  Alb  2.5<L>  /  TBili  2.0<H>  /  DBili  x   /  AST  42<H>  /  ALT  62  /  AlkPhos  55  03-05    RADIOLOGY & ADDITIONAL STUDIES:  ACC: [id number] EXAM:  MR MRCP WAW IC   ORDERED BY: JEFF ORTIZ   PROCEDURE DATE:  03/04/2024    COMPARISON: CT abdomen ultrasound dated 3/3/2024    FINDINGS:  LOWER CHEST: Basilar atelectasis.    LIVER: Steatosis.  BILE DUCTS: Normal caliber. No choledocholithiasis.  GALLBLADDER: Within normal limits.  SPLEEN: Within normal limits.  PANCREAS: Mild peripancreatic inflammation and fluid.  ADRENALS: Within normal limits.  KIDNEYS/URETERS: Tiny left renal cyst. No hydronephrosis.    VISUALIZED PORTIONS:  BOWEL: Negative gastrectomy. Scattered colonic diverticula.  PERITONEUM: No ascites.  VESSELS: Within normal limits.  RETROPERITONEUM/LYMPH NODES: No lymphadenopathy.  ABDOMINAL WALL: Within normal limits.  BONES: Within normal limits.    IMPRESSION:  No cholelithiasis, biliary ductal dilatation, or choledocholithiasis.  Hepatic steatosis.  Pancreatitis.    --- End of Report ---  JOSÉ LUIS GLASS MD; Attending Radiologist    ASSESSMENT & PLAN  40 yo male w/ PMH of pancreatitis, s/p LSG, appendectomy, a/w pancreatitis. MRCP w/w/o IV contrast yesterday negative for cholelithiasis, choledocho, nonsuggestive for biliary obstruction.  Pt endorses significant improvement in abdominal pain. Tolerating full liquid diet.    Plan:  - Continue diet, consider advancement of diet as pt pain/tenderness improves  - MRCP noted; GI recs appreciated, likely EtOH induced  - CIWA  - rest of care per primary team     - No acute surgical intervention warranted at this time.

## 2024-03-05 NOTE — DISCHARGE NOTE NURSING/CASE MANAGEMENT/SOCIAL WORK - NSDCFUADDAPPT_GEN_ALL_CORE_FT
resources for alcohol abstinence  AA meetings  [address] [facility] tues[facility] 7pm  [address] [facility] wednesday 7pm  [address] [facility] friday 7:30 pm    [email or web address]  [email or web address]  [email or web address]

## 2024-03-05 NOTE — DISCHARGE NOTE PROVIDER - NSDCFUADDAPPT_GEN_ALL_CORE_FT
resources for alcohol abstinence  AA meetings  [address] [facility]  [address] [facility] wednesday 7pm  [address] [facility] friday 7:30 pm    [email or web address]  [email or web address]  [email or web address]

## 2024-03-05 NOTE — DISCHARGE NOTE PROVIDER - CARE PROVIDER_API CALL
Mina Gallardo  Internal Medicine  [address]  [place] [address]  Phone: [phone]  Fax: [phone]  Follow Up Time:

## 2024-03-05 NOTE — DISCHARGE NOTE PROVIDER - NSDCCPCAREPLAN_GEN_ALL_CORE_FT
Marci returns at this time for her multiple medical problems including hypertension, chronic low back pain, history of recent atrial fibrillation and continued use of anticoagulants.  Generally she is doing okay though her back is continuing to bother.  She is getting out to physician's appointments [place] and to her daughters.  She remains on warfarin which is not been recently reexamined.  Recently had her potassium increased by nephrology does she is having difficulty swallowing the tablets. We have suggested dissolving and water and consuming with a straw etc.      Patient Active Problem List   Diagnosis   • Gastroparesis   • Chronic bilateral low back pain without sciatica   • Benign essential HTN   • Type II or unspecified type diabetes mellitus with renal manifestations, not stated as uncontrolled   • Unspecified hypothyroidism   • Chronic kidney disease (CKD), stage II (mild)   • Anemia   • Carpal tunnel syndrome   • Disturbance of skin sensation   • Pain in limb   • Medicare annual wellness visit, subsequent   • Carpal tunnel syndrome, left   • Spinal stenosis, lumbar region, without neurogenic claudication   • DDD (degenerative disc disease), lumbar   • Lumbar radiculopathy   • Current use of long term anticoagulation   • Status post total right knee replacement   • NSTEMI (non-ST elevated myocardial infarction) (CMS/[facility])   • Congestive heart failure of unknown etiology (CMS/[facility])   • S/P CABG x 2   • S/P TVR (tricuspid valve repair)   • Adjustment disorder with depressed mood   • Shortness of breath   • Hyperkalemia   • Acute on chronic kidney failure (CMS/HCC)   • Atrial fibrillation (CMS/HCC)   • Non-healing surgical wound   • Rectal bleeding   • Functional diarrhea   • Cancer of ascending colon (CMS/[facility])   • Generalized weakness   • Encounter for long-term (current) use of high-risk medication   • History of colon cancer, stage II     ALLERGIES:   Allergen Reactions   • Acetaminophen-Codeine GI UPSET    • Latex Other (See Comments)     Swelling & blisters of skin   • Propoxyphene N-Apap GI UPSET   • Tramadol NAUSEA   • Adhesive PRURITUS     Skin blisters and weeps. Tape used during hip replacement. No problem with tegaderm or plastic tape   • Enalapril Maleate Cough   • Neurontin [Gabapentin] DIZZINESS   • Oxycodone Hcl GI UPSET   • Codeine Nausea & Vomiting   • Epsom Salt [Magnesium Sulfate Heptahydrate]      ? Rash and BP elevation   • Morphine Nausea & Vomiting     Oral Morphine only. IV not a problem.         Medication list reviewed and correct.  Current Outpatient Prescriptions   Medication Sig   • metoCLOPramide (REGLAN) 5 MG tablet Take 1 tablet by mouth 3 times daily (before meals).   • BD INSULIN SYRINGE ULTRAFINE 31G X 15/64\" 0.3 ML Misc USE 4 PER DAY   • potassium chloride (K-DUR,KLOR-CON) 20 MEQ CR tablet Take 1 tablet by mouth 2 times daily.   • insulin glargine (LANTUS) 100 UNIT/ML injectable solution Inject 21 Units into the skin nightly.   • famotidine (PEPCID) 20 MG tablet Take 1 tablet by mouth daily.   • levothyroxine (LEVOXYL) 125 MCG tablet Take 1 tablet by mouth daily.   • warfarin (COUMADIN) 3 MG tablet Take one and one-half tablets nightly or as otherwise directed by physician   • carvedilol (COREG) 6.25 MG tablet Take 1 tablet by mouth 2 times daily (with meals).   • atorvastatin (LIPITOR) 40 MG tablet Take 1 tablet by mouth nightly.   • LORazepam (ATIVAN) 0.5 MG tablet Take 1 tablet by mouth 2 times daily as needed for Anxiety.   • irbesartan (AVAPRO) 150 MG tablet Take 0.5 tablets by mouth nightly.   • traMADol (ULTRAM) 50 MG tablet Take 1 tablet by mouth 3 times daily. With the tylenol   • nortriptyline (PAMELOR) 10 MG capsule Take 2 capsules by mouth nightly.   • Lancets (ONETOUCH ULTRASOFT) Misc To check BG TID AC and HS. E10.65. OK to substitute based on insurance.   • blood glucose test strip To check BG TID AC and HS. E10.65. OK to substitute based on insurance.   • furosemide  (LASIX) 40 MG tablet Take 1.5 tablets by mouth 2 times daily.   • insulin lispro (HUMALOG) 100 UNIT/ML injectable solution Inject into the skin 3 times daily before meals as per sliding scale: If BS = 5 units, 101-149= 7 units, 150-200= 8 units, 201-250= 9 units, 251-300= 10 units, 301-350= 11 units, 351-400= 12 units   • Insulin Syringe-Needle U-100 (BD INSULIN SYRINGE ULTRAFINE) 31G X 5/16\" 0.3 ML Misc Use to inject insulin 4 times daily.   • acetaminophen (TYLENOL) 500 MG tablet Take 1,000 mg by mouth 3 times daily. Dose: 2 tabs (=1,000mg)   • Blood Glucose Monitoring Suppl (ONE TOUCH ULTRA 2) w/Device Kit To check BG TID AC and HS. E10.65. OK to substitute based on insurance.     No current facility-administered medications for this visit.         Family History   Problem Relation Age of Onset   • Diabetes Mother    • Heart disease Mother    • Arthritis Father    • Diabetes Grandchild    • Hypertension Other      Family member not identified   • Heart Other      CAD, Family member not identified   • Arthritis Sister    • Arthritis Brother    • Stroke Brother    • Arthritis Sister    • Cancer Sister      ovary/uterus   • Arthritis Sister    • Arthritis Brother    • Heart disease Brother    • Arthritis Brother    • Arthritis Brother       Social History     Social History   • Marital status:      Spouse name: Wyatt Horta   • Number of children: 5   • Years of education: N/A     Occupational History   •       Retired     Social History Main Topics   • Smoking status: Never Smoker   • Smokeless tobacco: Never Used   • Alcohol use No      Comment: Special occasion once a great while   • Drug use: No   • Sexual activity: Not Asked     Other Topics Concern   • Seat Belt Yes     Social History Narrative   • None        REVIEW OF SYSTEMS:  No change in appetite.  Denies fever or chills.  No sweat or significant change in fatigue.    No complaints of blurry vision, ocular redness or drainage.  No  significant headache or earache.  No sore throat or glandular enlargement.   No significant rhinorrhea or sinus congestion.  Not clinically hard of hearing.  No chest pain, dyspnea or exertion.  Denies palpitations.  No noted murmurs.  Denies orthopnea.  Denies SOB, wheezing or sputum production.    No significant nausea, vomiting, diarrhea or constipation.  No significant heartburn or abdominal pan.  No enlarged lymph glands or bleeding/bruising tendencies.  Denies rash, new skin lesions or other skin condition.  Denies dizziness, seizure disorder, numbness or tingling.  No significant change in weakness or change in memory.  Not depressed or duly anxious.  Insomnia is not a significant problem.  Denies incontinence or urine to a significant degree.  No dysuria, hematuria or urinary urgency.  Nocturia is not an issue.    PHYSICAL EXAM:  Vitals:   Visit Vitals  /78   Pulse 80   Wt 84.8 kg   BMI 36.52 kg/m²      Obese white female in wheelchair  Carotids are without bruits  The chest is clear anteriorly and posteriorly  My auscultation today does demonstrate normal S1 and S2 without evidence of atrial fibrillation or obvious murmur  Abdomen is obese soft and nontender  Extremities are thick but without obvious pitting edema today.    Lab Results   Component Value Date    CHOLESTEROL 112 05/12/2017    HDL 45 (L) 05/12/2017    CALCLDL 31 05/12/2017    TRIGLYCERIDE 182 (H) 05/12/2017     Lab Results   Component Value Date    GLUCOSE 140 (H) 12/22/2017    SODIUM 137 12/22/2017    POTASSIUM 4.7 12/22/2017    CHLORIDE 100 12/22/2017    BUN 53 (H) 12/22/2017    CREATININE 2.18 (H) 12/22/2017    CALCIUM 8.4 12/22/2017    CALCIUM 8.8 08/17/2017    ALBUMIN 3.3 (L) 10/27/2017    AST 28 10/27/2017    ALKPT 102 10/27/2017    GPT 23 10/27/2017    ANIONGAP 14 12/22/2017    ANIONGAP 12 08/17/2017    BCRAT 24 12/22/2017    GLOB 3.7 10/27/2017    AGR 0.9 (L) 10/27/2017     Lab Results   Component Value Date    WBC 7.6 11/27/2017     RBC 4.24 11/27/2017     11/27/2017     08/17/2017     04/25/2012    HGB 13.0 11/27/2017    HCT 38.7 11/27/2017      Lab Results   Component Value Date    HGBA1C 7.6 11/21/2017    TSH 1.571 11/27/2017    T4FREE 1.35 08/03/2017       PROTEIN (URINE)   Date Value Ref Range Status   04/13/2011 NEGATIVE NEG Final     MICROALBUMIN,UA (TTL)   Date Value Ref Range Status   04/13/2011 11.1 MG/L Final     MICROALBUMIN, UA (TTL)   Date Value Ref Range Status   03/22/2017 31.10 mg/dL Final     CREATININE, URINE (TOTAL)   Date Value Ref Range Status   11/27/2017 20.00 mg/dL Final     Comment:     Reference range not available.   03/22/2017 46.70 mg/dL Final     MICROALBUMIN/CREAT   Date Value Ref Range Status   04/13/2011 11.8 0.0 - 30.0 MG/G CREAT Final     Comment:     (NOTE)  Category              Spot collection RATIO                        (microalbumin/creat)  Normal                  <30    MG/G Creat  Microalbuminuria         MG/G Creat  Clinical Albuminuria    >300   MG/G Creat     MICROALBUMIN/CREATININE   Date Value Ref Range Status   03/22/2017 666.0 (H) <30 mcg/mg Final     Comment:     Consistent with clinical albuminuria.     SPECIMEN TYPE   Date Value Ref Range Status   08/20/2017 URINE  Final     COLOR   Date Value Ref Range Status   08/20/2017 YELLOW YELLOW Final     APPEARANCE   Date Value Ref Range Status   08/20/2017 HAZY  Final     BILIRUBIN   Date Value Ref Range Status   08/20/2017 NEGATIVE NEGATIVE Final     KETONES   Date Value Ref Range Status   08/20/2017 NEGATIVE NEGATIVE mg/dL Final     BLOOD   Date Value Ref Range Status   08/20/2017 NEGATIVE NEGATIVE Final     pH   Date Value Ref Range Status   08/20/2017 5.5 5.0 - 7.0 Units Final     PROTEIN(URINE)   Date Value Ref Range Status   08/20/2017 100 (A) NEGATIVE mg/dL Final     NITRITE   Date Value Ref Range Status   08/20/2017 NEGATIVE NEGATIVE Final     LEUKOCYTE ESTERASE   Date Value Ref Range Status   08/20/2017 TRACE  (A) NEGATIVE Final     Comment:     Culture indicated, results to follow.            Assessment:   1. Controlled type 2 diabetes mellitus with diabetic nephropathy, with long-term current use of insulin (CMS/HCC) continue as currently prescribed    2. Benign essential HTN , I've asked her to have her potassium rechecked today to see if still appropriate at b.i.d. dosing    3. History of colon cancer, stage II , follow-up colonoscopy was suggested approximately 6 months after surgery which would be towards the end of the first quarter 2018    4. Generalized weakness    5. Spinal stenosis, lumbar region, without neurogenic claudication , refer back to pain management for further consideration of intervention of spinal cord stimulator     6. Current use of long term anticoagulation    7. Paroxysmal atrial fibrillation (CMS/HCC) , appears to remain in sinus rhythm. Per electrophysiology, we can stop the warfarin at this time.        Plan:   Patient Instructions   Prothrombin time and potassium today     Lets stop the warfarin     Call Dr Bruno for further discussion on the spinal cord stimulator    BP still needs to be a little better controlled, see what DR Chinchilla says.     Stir up the potassium to try to get down easier     Colonoscopy sometime after the first quarter. ( [place])    See me back in 2 months              PRINCIPAL DISCHARGE DIAGNOSIS  Diagnosis: Acute pancreatitis  Assessment and Plan of Treatment:       SECONDARY DISCHARGE DIAGNOSES  Diagnosis: Moderate alcohol use disorder  Assessment and Plan of Treatment:

## 2024-03-22 NOTE — PATIENT PROFILE ADULT - NSASFALLATTEMPTOOB_GEN_A_NUR
[facility]  [facility]  [address]  [place] [address]  Dept: [phone]    Randall Mcclain is a 14 y.o. female established patient, who presents today for her medical conditions/complaints as noted below:    Chief Complaint   Patient presents with    Knee Pain     ER f/u for rt knee finding a non cancerous tumor. Knee is swollen and pain level at a 7.       HPI:     Patient was seen at [place] ER 3/21/2024 after being hit in the calf by a 7 lb shot-put. X-ray showed a possible nonossifying fibroma of the proximal tibia. Mom and patient present today to discuss.    Reviewed prior notes: previous PCP  Reviewed previous labs, imaging, and hospital records.    No results found for: \"LDLCHOLESTEROL\", \"LDLCALC\"    (goal LDL is <100)   AST (U/L)   Date Value   04/18/2017 22     ALT (U/L)   Date Value   04/18/2017 13     BUN (mg/dL)   Date Value   04/18/2017 12     TSH (mIU/L)   Date Value   02/08/2017 2.86     BP Readings from Last 3 Encounters:   03/22/24 116/70 (75 %, Z = 0.67 /  67 %, Z = 0.44)*   08/17/22 110/68 (63 %, Z = 0.33 /  71 %, Z = 0.55)*   02/18/22 120/80 (89 %, Z = 1.23 /  96 %, Z = 1.75)*     *BP percentiles are based on the 2017 AAP Clinical Practice Guideline for girls          (goal 120/80)    Past Medical History:   Diagnosis Date    Malignant hyperthermia     SIBLINGS HAVE MALINANT HYPERTHERMIA    Malignant hyperthermia 02/2017      Past Surgical History:   Procedure Laterality Date    COLONOSCOPY  02/23/2017    MI COLONOSCOPY FLX DX W/COLLJ SPEC WHEN PFRMD  2/23/2017    COLONOSCOPY DIAGNOSTIC OR SCREENING performed by Teddy Estrada MD at [facility] OR    MI ESOPHAGOGASTRODUODENOSCOPY TRANSORAL DIAGNOSTIC  2/23/2017    EGD ESOPHAGOGASTRODUODENOSCOPY performed by Teddy Estrada MD at [facility] OR    UPPER GASTROINTESTINAL ENDOSCOPY  02/23/2017       Family History   Problem Relation Age of Onset    Other Other         Gallstones    Asthma Mother      no

## 2024-07-22 PROBLEM — J69.0 PNEUMONITIS DUE TO INHALATION OF FOOD AND VOMIT: Chronic | Status: ACTIVE | Noted: 2024-03-03

## 2024-07-22 PROBLEM — K85.90 ACUTE PANCREATITIS WITHOUT NECROSIS OR INFECTION, UNSPECIFIED: Chronic | Status: ACTIVE | Noted: 2024-03-03

## 2024-07-25 ENCOUNTER — TRANSCRIPTION ENCOUNTER (OUTPATIENT)
Age: 39
End: 2024-07-25

## 2024-08-05 NOTE — PATIENT PROFILE ADULT - HOME ACCESSIBILITY CONCERNS
Subjective:     The patient is a 49 y.o. obese female with a history of laparoscopic gastric bypass performed in , with satisfactory weight loss results, but ultimately experiencing weight regain.  She presented last August to initiate process for possible revision; personal events, insurance changes prevented her completing the process, and she returns to restart efforts at gaining approval for revision of gastric bypass.  She has gained 6 pounds since her last visit 12 months ago.  She is resuming her exercise program, trying to avoid liquid calories and fried/fatty foods.  She is still trying to obtain formal sleep study to assess for obstructive sleep apnea.    Bariatric comorbidities present are   Past Medical History:   Diagnosis Date    Asthma 2014    Hx of cornea transplant     Left Eye    Osteoarthritis 2023       Patient Active Problem List    Diagnosis Date Noted    Morbid obesity (HCC) 2023    Snoring 2023    Status post gastric bypass for obesity 2017     Past Medical History:   Diagnosis Date    Asthma 2014    Hx of cornea transplant     Left Eye    Osteoarthritis 2023      Past Surgical History:   Procedure Laterality Date    BREAST ENHANCEMENT SURGERY       SECTION      GASTRIC BYPASS SURGERY N/A     By Dr. Sukumar Pendleton    LAMELLAR KERATOPLASTY        Social History     Tobacco Use    Smoking status: Never    Smokeless tobacco: Never   Substance Use Topics    Alcohol use: Not Currently     Alcohol/week: 2.0 standard drinks of alcohol     Types: 2 Glasses of wine per week     Comment: Socially      Family History   Problem Relation Age of Onset    Breast Cancer Mother     Cancer Mother       Prior to Admission medications    Medication Sig Start Date End Date Taking? Authorizing Provider   cyclobenzaprine (FLEXERIL) 10 MG tablet TAKE 1 TABLET BY MOUTH EVERY DAY AT BEDTIME AS NEEDED FOR 90 DAYS 23  Yes Provider, MD Peg 
Identified patient with two patient identifiers (name and ). Reviewed chart in preparation for visit and have obtained necessary documentation.    Anna Anderson is a 49 y.o. female  Chief Complaint   Patient presents with    Surgical Consult     Bariatric Revision     /87 (Site: Right Upper Arm, Position: Sitting, Cuff Size: Medium Adult)   Pulse 98   Temp 99 °F (37.2 °C) (Oral)   Resp 18   Ht 1.499 m (4' 11\")   Wt 104.3 kg (230 lb)   SpO2 97%   BMI 46.45 kg/m²     1. Have you been to the ER, urgent care clinic since your last visit?  Hospitalized since your last visit?no    2. Have you seen or consulted any other health care providers outside of the [facility] since your last visit?  Include any pap smears or colon screening. no  
stairs to enter home/stairs within home

## 2024-10-17 NOTE — ED ADULT NURSE NOTE - CAS TRG GEN SKIN CONDITION
Wound Care Center Physician Orders and Discharge Instructions  [facility]  [address], [address]  [place] [id number]  Telephone: [phone]      Fax: [phone]        Your home care company:   None .     Your wound-care supplies will be provided by:  Self .     NAME:  Lacho Gallegos   YOB: 1946  PRIMARY DIAGNOSIS FOR WOUND CARE CENTER:  Diabetic Ulcer .     Wound cleansing:   Do not scrub or use excessive force.  Wash hands with soap and water before and after dressing changes.  Prior to applying a clean dressing, cleanse wound with normal saline, wound cleanser, or mild soap and water. Ask your physician or nurse before getting the wound(s) wet in the shower.                Wound care for home:     Right Lateral Ankle  Nexodyn or Vashe spray  Calmoseptine ru wound  Santyl then Sorbact to wound  JANE NPWT  Aquaphor/Triamcinolone to dry itchy areas  Foam to anterior ankle  CoFlex TLC Calamine compression wrap   Leave in place for the week - Do NOT get wet!        Your physician has ordered Compression for treatment of venous ulcers, venous insufficiency,and/or Lymphedema.   * Do not remove until your next scheduled visit in [facility]- do not get wet.    * If your wrap falls down, becomes painful or you have decreased sensation, and/or excessive drainage- please call the [facility] for RN visit to get your compression wrap changed   * You need to exercice as tolerated- walking is good   * Elevate your legs preferrably above level of your heart when sitting as much as possible   * The Goal of this therapy is to reduce Edema and get into long term compression garments to control venous insufficiency, lymphedema and reduce occurrence of venous ulcers          Please note, all wounds (unless stated otherwise here) were mechanically debrided at the time of cleansing here in the wound-care center today, so a small amount of pain, drainage or bleeding from that process might be 
Warm

## 2024-12-31 ENCOUNTER — INPATIENT (INPATIENT)
Facility: HOSPITAL | Age: 39
LOS: 2 days | Discharge: HOME | DRG: 440 | End: 2025-01-03
Attending: HOSPITALIST | Admitting: INTERNAL MEDICINE
Payer: MEDICAID

## 2024-12-31 VITALS
SYSTOLIC BLOOD PRESSURE: 144 MMHG | RESPIRATION RATE: 20 BRPM | HEART RATE: 84 BPM | DIASTOLIC BLOOD PRESSURE: 103 MMHG | WEIGHT: 210.98 LBS | HEIGHT: 68 IN | OXYGEN SATURATION: 98 % | TEMPERATURE: 98 F

## 2024-12-31 DIAGNOSIS — Z98.890 OTHER SPECIFIED POSTPROCEDURAL STATES: Chronic | ICD-10-CM

## 2024-12-31 DIAGNOSIS — Z90.49 ACQUIRED ABSENCE OF OTHER SPECIFIED PARTS OF DIGESTIVE TRACT: Chronic | ICD-10-CM

## 2024-12-31 DIAGNOSIS — Z98.84 BARIATRIC SURGERY STATUS: Chronic | ICD-10-CM

## 2024-12-31 LAB
ALBUMIN SERPL ELPH-MCNC: 3.9 G/DL — SIGNIFICANT CHANGE UP (ref 3.3–5)
ALP SERPL-CCNC: 89 U/L — SIGNIFICANT CHANGE UP (ref 40–120)
ALT FLD-CCNC: 93 U/L — HIGH (ref 12–78)
ANION GAP SERPL CALC-SCNC: 18 MMOL/L — HIGH (ref 5–17)
AST SERPL-CCNC: 126 U/L — HIGH (ref 15–37)
BASOPHILS # BLD AUTO: 0.03 K/UL — SIGNIFICANT CHANGE UP (ref 0–0.2)
BASOPHILS NFR BLD AUTO: 0.2 % — SIGNIFICANT CHANGE UP (ref 0–2)
BILIRUB SERPL-MCNC: 2.7 MG/DL — HIGH (ref 0.2–1.2)
BUN SERPL-MCNC: 16 MG/DL — SIGNIFICANT CHANGE UP (ref 7–23)
CALCIUM SERPL-MCNC: 10 MG/DL — SIGNIFICANT CHANGE UP (ref 8.5–10.1)
CHLORIDE SERPL-SCNC: 100 MMOL/L — SIGNIFICANT CHANGE UP (ref 96–108)
CO2 SERPL-SCNC: 16 MMOL/L — LOW (ref 22–31)
CREAT SERPL-MCNC: 0.84 MG/DL — SIGNIFICANT CHANGE UP (ref 0.5–1.3)
EGFR: 114 ML/MIN/1.73M2 — SIGNIFICANT CHANGE UP
EOSINOPHIL # BLD AUTO: 0.01 K/UL — SIGNIFICANT CHANGE UP (ref 0–0.5)
EOSINOPHIL NFR BLD AUTO: 0.1 % — SIGNIFICANT CHANGE UP (ref 0–6)
GLUCOSE SERPL-MCNC: 107 MG/DL — HIGH (ref 70–99)
HCT VFR BLD CALC: 48.9 % — SIGNIFICANT CHANGE UP (ref 39–50)
HGB BLD-MCNC: 17.9 G/DL — HIGH (ref 13–17)
IMM GRANULOCYTES NFR BLD AUTO: 0.5 % — SIGNIFICANT CHANGE UP (ref 0–0.9)
LIDOCAIN IGE QN: 2359 U/L — HIGH (ref 13–75)
LYMPHOCYTES # BLD AUTO: 1.06 K/UL — SIGNIFICANT CHANGE UP (ref 1–3.3)
LYMPHOCYTES # BLD AUTO: 8.4 % — LOW (ref 13–44)
MCHC RBC-ENTMCNC: 34.8 PG — HIGH (ref 27–34)
MCHC RBC-ENTMCNC: 36.6 G/DL — HIGH (ref 32–36)
MCV RBC AUTO: 95.1 FL — SIGNIFICANT CHANGE UP (ref 80–100)
MONOCYTES # BLD AUTO: 0.72 K/UL — SIGNIFICANT CHANGE UP (ref 0–0.9)
MONOCYTES NFR BLD AUTO: 5.7 % — SIGNIFICANT CHANGE UP (ref 2–14)
NEUTROPHILS # BLD AUTO: 10.7 K/UL — HIGH (ref 1.8–7.4)
NEUTROPHILS NFR BLD AUTO: 85.1 % — HIGH (ref 43–77)
NRBC # BLD: 0 /100 WBCS — SIGNIFICANT CHANGE UP (ref 0–0)
PLATELET # BLD AUTO: 178 K/UL — SIGNIFICANT CHANGE UP (ref 150–400)
POTASSIUM SERPL-MCNC: 3.1 MMOL/L — LOW (ref 3.5–5.3)
POTASSIUM SERPL-SCNC: 3.1 MMOL/L — LOW (ref 3.5–5.3)
PROT SERPL-MCNC: 8.2 G/DL — SIGNIFICANT CHANGE UP (ref 6–8.3)
RBC # BLD: 5.14 M/UL — SIGNIFICANT CHANGE UP (ref 4.2–5.8)
RBC # FLD: 12.2 % — SIGNIFICANT CHANGE UP (ref 10.3–14.5)
SODIUM SERPL-SCNC: 134 MMOL/L — LOW (ref 135–145)
WBC # BLD: 12.58 K/UL — HIGH (ref 3.8–10.5)
WBC # FLD AUTO: 12.58 K/UL — HIGH (ref 3.8–10.5)

## 2024-12-31 PROCEDURE — 74177 CT ABD & PELVIS W/CONTRAST: CPT | Mod: 26,MC

## 2024-12-31 PROCEDURE — 99285 EMERGENCY DEPT VISIT HI MDM: CPT

## 2024-12-31 RX ORDER — MORPHINE SULFATE 15 MG
4 TABLET, EXTENDED RELEASE ORAL EVERY 6 HOURS
Refills: 0 | Status: DISCONTINUED | OUTPATIENT
Start: 2024-12-31 | End: 2025-01-03

## 2024-12-31 RX ORDER — MORPHINE SULFATE 15 MG
2 TABLET, EXTENDED RELEASE ORAL EVERY 4 HOURS
Refills: 0 | Status: DISCONTINUED | OUTPATIENT
Start: 2024-12-31 | End: 2025-01-03

## 2024-12-31 RX ORDER — LORAZEPAM 1 MG/1
0.5 TABLET ORAL ONCE
Refills: 0 | Status: DISCONTINUED | OUTPATIENT
Start: 2024-12-31 | End: 2024-12-31

## 2024-12-31 RX ORDER — GINKGO BILOBA 40 MG
5 CAPSULE ORAL AT BEDTIME
Refills: 0 | Status: DISCONTINUED | OUTPATIENT
Start: 2024-12-31 | End: 2025-01-03

## 2024-12-31 RX ORDER — SODIUM CHLORIDE 9 MG/ML
2000 INJECTION, SOLUTION INTRAMUSCULAR; INTRAVENOUS; SUBCUTANEOUS ONCE
Refills: 0 | Status: COMPLETED | OUTPATIENT
Start: 2024-12-31 | End: 2024-12-31

## 2024-12-31 RX ORDER — ACETAMINOPHEN 80 MG/.8ML
1000 SOLUTION/ DROPS ORAL ONCE
Refills: 0 | Status: COMPLETED | OUTPATIENT
Start: 2024-12-31 | End: 2024-12-31

## 2024-12-31 RX ORDER — MAG HYDROX/ALUMINUM HYD/SIMETH 200-200-20
30 SUSPENSION, ORAL (FINAL DOSE FORM) ORAL EVERY 4 HOURS
Refills: 0 | Status: DISCONTINUED | OUTPATIENT
Start: 2024-12-31 | End: 2025-01-03

## 2024-12-31 RX ORDER — ONDANSETRON 4 MG/1
4 TABLET ORAL EVERY 8 HOURS
Refills: 0 | Status: DISCONTINUED | OUTPATIENT
Start: 2024-12-31 | End: 2025-01-03

## 2024-12-31 RX ORDER — ONDANSETRON 4 MG/1
4 TABLET ORAL ONCE
Refills: 0 | Status: COMPLETED | OUTPATIENT
Start: 2024-12-31 | End: 2024-12-31

## 2024-12-31 RX ORDER — TRIMETHOBENZAMIDE HCL 250 MG
200 CAPSULE ORAL ONCE
Refills: 0 | Status: COMPLETED | OUTPATIENT
Start: 2024-12-31 | End: 2024-12-31

## 2024-12-31 RX ORDER — ENOXAPARIN SODIUM 60 MG/.6ML
40 INJECTION INTRAVENOUS; SUBCUTANEOUS EVERY 24 HOURS
Refills: 0 | Status: DISCONTINUED | OUTPATIENT
Start: 2024-12-31 | End: 2025-01-03

## 2024-12-31 RX ORDER — IOHEXOL 350 MG/ML
30 INJECTION, SOLUTION INTRAVENOUS ONCE
Refills: 0 | Status: COMPLETED | OUTPATIENT
Start: 2024-12-31 | End: 2024-12-31

## 2024-12-31 RX ORDER — SODIUM CHLORIDE 9 MG/ML
1000 INJECTION, SOLUTION INTRAMUSCULAR; INTRAVENOUS; SUBCUTANEOUS ONCE
Refills: 0 | Status: COMPLETED | OUTPATIENT
Start: 2024-12-31 | End: 2024-12-31

## 2024-12-31 RX ORDER — SODIUM CHLORIDE, SODIUM GLUCONATE, SODIUM ACETATE, POTASSIUM CHLORIDE AND MAGNESIUM CHLORIDE 30; 37; 368; 526; 502 MG/100ML; MG/100ML; MG/100ML; MG/100ML; MG/100ML
1000 INJECTION, SOLUTION INTRAVENOUS
Refills: 0 | Status: DISCONTINUED | OUTPATIENT
Start: 2024-12-31 | End: 2025-01-01

## 2024-12-31 RX ORDER — METOCLOPRAMIDE 10 MG/1
10 TABLET ORAL ONCE
Refills: 0 | Status: COMPLETED | OUTPATIENT
Start: 2024-12-31 | End: 2024-12-31

## 2024-12-31 RX ADMIN — METOCLOPRAMIDE 10 MILLIGRAM(S): 10 TABLET ORAL at 13:16

## 2024-12-31 RX ADMIN — IOHEXOL 30 MILLILITER(S): 350 INJECTION, SOLUTION INTRAVENOUS at 10:03

## 2024-12-31 RX ADMIN — Medication 200 MILLIGRAM(S): at 20:29

## 2024-12-31 RX ADMIN — ONDANSETRON 4 MILLIGRAM(S): 4 TABLET ORAL at 09:56

## 2024-12-31 RX ADMIN — SODIUM CHLORIDE 2000 MILLILITER(S): 9 INJECTION, SOLUTION INTRAMUSCULAR; INTRAVENOUS; SUBCUTANEOUS at 09:56

## 2024-12-31 RX ADMIN — SODIUM CHLORIDE 1000 MILLILITER(S): 9 INJECTION, SOLUTION INTRAMUSCULAR; INTRAVENOUS; SUBCUTANEOUS at 13:21

## 2024-12-31 RX ADMIN — Medication 4 MILLIGRAM(S): at 16:45

## 2024-12-31 RX ADMIN — ENOXAPARIN SODIUM 40 MILLIGRAM(S): 60 INJECTION INTRAVENOUS; SUBCUTANEOUS at 22:44

## 2024-12-31 RX ADMIN — Medication 30 MILLILITER(S): at 15:17

## 2024-12-31 RX ADMIN — Medication 5 MILLIGRAM(S): at 20:28

## 2024-12-31 RX ADMIN — SODIUM CHLORIDE, SODIUM GLUCONATE, SODIUM ACETATE, POTASSIUM CHLORIDE AND MAGNESIUM CHLORIDE 150 MILLILITER(S): 30; 37; 368; 526; 502 INJECTION, SOLUTION INTRAVENOUS at 16:46

## 2024-12-31 RX ADMIN — ONDANSETRON 4 MILLIGRAM(S): 4 TABLET ORAL at 13:15

## 2024-12-31 RX ADMIN — Medication 4 MILLIGRAM(S): at 22:43

## 2024-12-31 RX ADMIN — ACETAMINOPHEN 400 MILLIGRAM(S): 80 SOLUTION/ DROPS ORAL at 11:29

## 2024-12-31 RX ADMIN — Medication 4 MILLIGRAM(S): at 17:40

## 2024-12-31 RX ADMIN — LORAZEPAM 0.5 MILLIGRAM(S): 1 TABLET ORAL at 11:10

## 2024-12-31 RX ADMIN — Medication 30 MILLILITER(S): at 09:56

## 2024-12-31 RX ADMIN — ONDANSETRON 4 MILLIGRAM(S): 4 TABLET ORAL at 16:45

## 2024-12-31 RX ADMIN — SODIUM CHLORIDE, SODIUM GLUCONATE, SODIUM ACETATE, POTASSIUM CHLORIDE AND MAGNESIUM CHLORIDE 150 MILLILITER(S): 30; 37; 368; 526; 502 INJECTION, SOLUTION INTRAVENOUS at 15:18

## 2024-12-31 RX ADMIN — ONDANSETRON 4 MILLIGRAM(S): 4 TABLET ORAL at 12:22

## 2024-12-31 NOTE — PATIENT PROFILE ADULT - FALL HARM RISK - UNIVERSAL INTERVENTIONS
Bed in lowest position, wheels locked, appropriate side rails in place/Call bell, personal items and telephone in reach/Instruct patient to call for assistance before getting out of bed or chair/Non-slip footwear when patient is out of bed/[place] to call system/Physically safe environment - no spills, clutter or unnecessary equipment/Purposeful Proactive Rounding/Room/bathroom lighting operational, light cord in reach

## 2024-12-31 NOTE — CARE COORDINATION ASSESSMENT. - NSCAREPROVIDERS_GEN_ALL_CORE_FT
CARE PROVIDERS:  Accepting Physician: Crissy Plaza  Access Services: Jess Canela  Administration: Melba Ferrer  Admitting: Crissy Plaza  Attending: Crissy Plaza  Consultant: Ava Douglass  ED Attending: Nabil Bojorquez  ED Nurse: Payal Mahmood  Outpatient Provider: Crissy Plaza  : Lucretia Hugo

## 2024-12-31 NOTE — H&P ADULT - NSICDXPASTMEDICALHX_GEN_ALL_CORE_FT
PAST MEDICAL HISTORY:  Anxiety     H/O pyloric stenosis     Pancreatitis     Pneumonia, aspiration

## 2024-12-31 NOTE — H&P ADULT - NSHPPHYSICALEXAM_GEN_ALL_CORE
Vitals last 24 hrs  T(C): 36.5 (12-31-24 @ 08:23), Max: 36.5 (12-31-24 @ 08:23)  HR: 84 (12-31-24 @ 08:23) (84 - 84)  BP: 144/103 (12-31-24 @ 08:23) (144/103 - 144/103)  RR: 20 (12-31-24 @ 08:23) (20 - 20)  SpO2: 98% (12-31-24 @ 08:23) (98% - 98%)    PHYSICAL EXAM:  GENERAL: NAD, well-groomed, well-developed  HEAD:  Atraumatic, Normocephalic  EYES: EOMI, PERRLA, conjunctiva and sclera clear  ENMT: No tonsillar erythema, exudates, or enlargement; Moist mucous membranes  NECK: Supple, No JVD, Normal thyroid  HEART: Regular rate and rhythm; No murmurs, rubs, or gallops  RESPIRATORY: CTA B/L, No W/R/R  ABDOMEN: Soft, upper abd tender, Nondistended; Bowel sounds present  NEUROLOGY: A&Ox3, nonfocal, moving all extremities  EXTREMITIES:  2+ Peripheral Pulses, No clubbing, cyanosis, or edema  SKIN: warm, dry, normal color, no rash or abnormal lesions

## 2024-12-31 NOTE — H&P ADULT - NSICDXPASTSURGICALHX_GEN_ALL_CORE_FT
PAST SURGICAL HISTORY:  H/O bariatric surgery october 2021    History of excision of pilonidal cyst     History of repair of pyloric stenosis     S/P appendectomy september 2021

## 2024-12-31 NOTE — CARE COORDINATION ASSESSMENT. - NSPASTMEDSURGHISTORY_GEN_ALL_CORE_FT
PAST MEDICAL & SURGICAL HISTORY:  Anxiety      H/O pyloric stenosis      History of excision of pilonidal cyst      History of repair of pyloric stenosis      Pneumonia, aspiration      Pancreatitis      H/O bariatric surgery  october 2021      S/P appendectomy  september 2021

## 2024-12-31 NOTE — ED ADULT TRIAGE NOTE - BSA (M2)
Name: Leo Garduno      : 1956      MRN: [id number]  Encounter Provider: Aravind Urias MD  Encounter Date: 2024   Encounter department: [facility] [address]    Assessment & Plan  Medicare annual wellness visit, subsequent         BMI 26.0-26.9,adult         Micrographia  Patient reports intermittent right hand weakness and shakiness with some difficulty with writing.  Not symptomatic every day, symptoms are worse in the morning after waking up, suspicion for possible nerve compression while sleeping.  Asymptomatic with normal exam in office with no weakness, tremor or decreased sensation.    Orders:    Ambulatory Referral to Neurology; Future    Vitamin D 25 hydroxy; Future    Vitamin B12/Folate, Serum Panel; Future    Comprehensive metabolic panel; Future    Need for hepatitis C screening test    Orders:    Hepatitis C Antibody; Future    Mixed hyperlipidemia    Orders:    Lipid Panel with Direct LDL reflex; Future    rosuvastatin (CRESTOR) 10 MG tablet; Take 1 tablet (10 mg total) by mouth daily    Achilles tendinitis, left leg  Patient reports several weeks of pain in the left Achilles area, denies any weakness or inciting injury.  Full strength and range of motion on exam without deformity or concern for tendon rupture.  He reports his symptoms are improving with stretches and exercises that his son recommended who is a family medicine doctor.  PT referral declined, suggested considering PT if symptoms do not continue to improve.  Achilles tendinitis handout reviewed and provided with exercises and stretches.    Orders:    Vitamin D 25 hydroxy; Future    Vitamin B12/Folate, Serum Panel; Future    Comprehensive metabolic panel; Future    Disorder of bone, unspecified  Checking vitamin D given that patient is taking a daily vitamin D supplement.  He has a history of spondylolysis of the cervical spine that is improved with recent physical therapy  Orders:     Vitamin D 25 hydroxy; Future    Erectile dysfunction, unspecified erectile dysfunction type  Viagra refill placed  Orders:    sildenafil (VIAGRA) 25 MG tablet; Take 1 tablet (25 mg total) by mouth daily as needed for erectile dysfunction       Preventive health issues were discussed with patient, and age appropriate screening tests were ordered as noted in patient's After Visit Summary. Personalized health advice and appropriate referrals for health education or preventive services given if needed, as noted in patient's After Visit Summary.    History of Present Illness     HPI   Patient Care Team:  Renita Gay MD as PCP - General  Renita Gay MD    Review of Systems   Constitutional:  Negative for chills and fever.   Respiratory:  Negative for cough and shortness of breath.    Cardiovascular:  Negative for chest pain.   Gastrointestinal:  Negative for blood in stool, diarrhea, nausea and vomiting.   Endocrine: Negative for polyuria.   Genitourinary:  Negative for dysuria and hematuria.   Skin:  Negative for rash.   Neurological:  Positive for tremors (right hand intermittent) and weakness (right hand). Negative for dizziness, speech difficulty, numbness and headaches.     Medical History Reviewed by provider this encounter:       Annual Wellness Visit Questionnaire   Leo is here for his Subsequent Wellness visit.     Health Risk Assessment:   Patient rates overall health as very good. Patient feels that their physical health rating is slightly worse. Patient is very satisfied with their life. Eyesight was rated as same. Hearing was rated as same. Patient feels that their emotional and mental health rating is same. Patients states they are sometimes angry. Patient states they are sometimes unusually tired/fatigued. Pain experienced in the last 7 days has been some. Patient's pain rating has been 3/10.     Depression Screening:   PHQ-2 Score: 0      Fall Risk Screening:   In the past year, patient has  experienced: no history of falling in past year      Home Safety:  Patient does not have trouble with stairs inside or outside of their home. Patient has working smoke alarms and has working carbon monoxide detector. Home safety hazards include: none.     Nutrition:   Current diet is Low Cholesterol.     Medications:   Patient is currently taking over-the-counter supplements. OTC medications include: see medication list. Patient is able to manage medications.     Activities of Daily Living (ADLs)/Instrumental Activities of Daily Living (IADLs):   Walk and transfer into and out of bed and chair?: Yes  Dress and groom yourself?: Yes    Bathe or shower yourself?: Yes    Feed yourself? Yes  Do your laundry/housekeeping?: Yes  Manage your money, pay your bills and track your expenses?: Yes  Make your own meals?: Yes    Do your own shopping?: Yes    Previous Hospitalizations:   Any hospitalizations or ED visits within the last 12 months?: No      PREVENTIVE SCREENINGS      Cardiovascular Screening:    General: Screening Not Indicated and History Lipid Disorder      Diabetes Screening:     General: Screening Current      Colorectal Cancer Screening:     General: Screening Current      Prostate Cancer Screening:    General: Screening Current      Osteoporosis Screening:    General: Screening Not Indicated      Abdominal Aortic Aneurysm (AAA) Screening:    Risk factors include: age between 65-76 yo        General: Screening Not Indicated      Lung Cancer Screening:     General: Screening Not Indicated    Screening, Brief Intervention, and Referral to Treatment (SBIRT)    Screening  Typical number of drinks in a day: 0  Typical number of drinks in a week: 2  Interpretation: Low risk drinking behavior.    Social Drivers of Health     Financial Resource Strain: Low Risk  (12/8/2023)    Overall Financial Resource Strain (CARDIA)     Difficulty of Paying Living Expenses: Not hard at all   Transportation Needs: No Transportation  "Needs (12/8/2023)    PRAPARE - Transportation     Lack of Transportation (Medical): No     Lack of Transportation (Non-Medical): No     No results found.    Objective   Pulse 68   Temp 97.9 °F (36.6 °C)   Ht 5' 3.75\" (1.619 m)   Wt 68.3 kg (150 lb 9.6 oz)   SpO2 98%   BMI 26.05 kg/m²     Physical Exam  Constitutional:       Appearance: Normal appearance.   Cardiovascular:      Rate and Rhythm: Normal rate and regular rhythm.      Heart sounds: Normal heart sounds. No murmur heard.  Pulmonary:      Effort: Pulmonary effort is normal. No respiratory distress.      Breath sounds: Normal breath sounds.   Musculoskeletal:         General: No swelling, tenderness, deformity or signs of injury.      Comments: Normal bilateral hand exam with strength and sensation intact, no tremor.  Normal left lower leg exam, no deformity of the calf or Achilles area, full strength with plantarflexion   Neurological:      Mental Status: He is alert and oriented to person, place, and time.   Psychiatric:         Mood and Affect: Mood normal.         Behavior: Behavior normal.         " 2.09

## 2024-12-31 NOTE — ED ADULT NURSE NOTE - OBJECTIVE STATEMENT
a/ox4 patient came to ED c/o vomiting, weakness. Patient states he had a gastric sleeve placed in 2021 and since then develops dumping syndrome at times. He also states he has a hx of pancreatitis but states it doesn't feel like that. Patient dry heaving, continuously vomiting. Afebrile. No diarrhea, sob, cp. Pending further labs and radiology reports.

## 2024-12-31 NOTE — CARE COORDINATION ASSESSMENT. - OTHER PERTINENT REFERRAL INFORMATION
Sw met with Pt at bedside. Pt is A & O x4 and able to make his needs known. Pt lives in a pvt home with mother, sister, brother in law and 5 year old niece. Pt has 3 RJ and 2 set of 6-7 steps inside. 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 Pt is Indep with his ADLS and no hx of hc, DME or WEN. PCP: Mina Ramos [phone] Foreman: JANELLE [phone].

## 2024-12-31 NOTE — H&P ADULT - NSHPREVIEWOFSYSTEMS_GEN_ALL_CORE
REVIEW OF SYSTEMS:    CONSTITUTIONAL: No weakness, fevers or chills  EYES/ENT: No visual changes;  No vertigo or throat pain   NECK: No pain or stiffness  RESPIRATORY: No cough, wheezing, hemoptysis; No shortness of breath  CARDIOVASCULAR: No chest pain or palpitations  GASTROINTESTINAL: + abdominal or epigastric pain. + nausea, vomiting, or hematemesis; No diarrhea or constipation. No melena or hematochezia.  GENITOURINARY: No dysuria, frequency or hematuria  NEUROLOGICAL: No numbness or weakness  SKIN: No itching, rashes

## 2024-12-31 NOTE — H&P ADULT - NSHPLABSRESULTS_GEN_ALL_CORE
Tele call to [place] [employer]. Discussion with ariel. md FRED name provided.  Per ariel, all medicare [place] insurance denials need to have appeal letter faxed, peer to peer is not an option.   LABS:                        17.9   12.58 )-----------( 178      ( 31 Dec 2024 10:20 )             48.9     12-31    134[L]  |  100  |  16  ----------------------------<  107[H]  3.1[L]   |  16[L]  |  0.84    Ca    10.0      31 Dec 2024 10:20    TPro  8.2  /  Alb  3.9  /  TBili  2.7[H]  /  DBili  x   /  AST  126[H]  /  ALT  93[H]  /  AlkPhos  89  12-31      CAPILLARY BLOOD GLUCOSE            Urinalysis Basic - ( 31 Dec 2024 10:20 )    Color: x / Appearance: x / SG: x / pH: x  Gluc: 107 mg/dL / Ketone: x  / Bili: x / Urobili: x   Blood: x / Protein: x / Nitrite: x   Leuk Esterase: x / RBC: x / WBC x   Sq Epi: x / Non Sq Epi: x / Bacteria: x        RADIOLOGY & ADDITIONAL TESTS:

## 2024-12-31 NOTE — ED ADULT TRIAGE NOTE - CHIEF COMPLAINT QUOTE
Reports vomiting and abd pain x2 days, hx Gastric sleeve 2021. Reports episode of dumping syndrome this past Thanksgiving. Denies fevers, reports chills.

## 2024-12-31 NOTE — CAREGIVER ENGAGEMENT NOTE - CAREGIVER EDUCATION/DISCUSSION
[facility] Cardiothoracic Surgery  Progress Note  7/3/2024    9:08 AM    Name:   Vincenzo Darden      Day:  7  Admit Date:  6/26/2024  4:33 AM      CC: SOB    Subjective:     No new complaints    Physical Examination:        BP (!) 157/82   Pulse 72   Temp 97.6 °F (36.4 °C) (Oral)   Resp 22   Ht 1.829 m (6' 0.01\")   Wt 126 kg (277 lb 12.5 oz)   SpO2 96%   BMI 37.67 kg/m²   General appearance:  AAO x 3  Lungs:  diminished right  Heart: RR  Abdomen: benign  Extremities: right aka left no edema    Medications:     Current Meds:   Scheduled Meds:    ALTEplase (CATHFLO) 5 mg in sodium chloride 0.9 % 30 mL  5 mg IntraPLEUral Once    And    dornase alpha (PULMOZYME) 5 mg in sterile water 30 mL  5 mg IntraPLEUral Q12H    insulin glargine  25 Units SubCUTAneous Nightly    carvedilol  12.5 mg Oral BID WC    linezolid  600 mg Oral 2 times per day    epoetin  8,000 Units IntraVENous Once per day on Tue Thu Sat    sodium chloride flush  5-40 mL IntraVENous 2 times per day    sodium chloride flush  5-40 mL IntraVENous 2 times per day    heparin (porcine)  5,000 Units SubCUTAneous 3 times per day    insulin lispro  0-4 Units SubCUTAneous TID WC    insulin lispro  0-4 Units SubCUTAneous Nightly    amLODIPine  10 mg Oral Daily    aspirin  81 mg Oral Daily    atorvastatin  40 mg Oral Nightly    gabapentin  100 mg Oral 3 times per day    tamsulosin  0.4 mg Oral Daily    pantoprazole  40 mg Oral QAM AC     Continuous Infusions:    sodium chloride      sodium chloride      dextrose       PRN Meds: HYDROmorphone, oxyCODONE-acetaminophen, diphenhydrAMINE-zinc acetate, tiZANidine, labetalol, sodium chloride flush, sodium chloride, sodium chloride flush, sodium chloride, ondansetron **OR** ondansetron, polyethylene glycol, acetaminophen **OR** acetaminophen, glucose, dextrose bolus **OR** dextrose bolus, glucagon (rDNA), dextrose, heparin (porcine), heparin (porcine)    I/O     I/O (24Hr):    Intake/Output Summary (Last 24 hours) at  No

## 2024-12-31 NOTE — H&P ADULT - ASSESSMENT
39y Male PMHx depression, gastric sleeve surgery, past alcoholic, hx of pancreatitis 6 mon ago, recently dx dumping syndrome event now aw abdominal pain/n/v    CTa/p Sleeve gastrectomy. No bowel obstruction.  Acute interstitial pancreatitis of the neck and body.    #Acute pancreatitis  npo, ivf  zofran prn  pain control  GI cs fu    DVT ppx      [employer]   [phone]

## 2024-12-31 NOTE — CONSULT NOTE ADULT - SUBJECTIVE AND OBJECTIVE BOX
[facility]  Natan Ordaz PA-C  [address] [place] [id number] [phone]      Chief Complaint:  Patient is a 39y old  Male who presents with a chief complaint of abdominal pain    HPI:  39y Male PMHx gastric sleeve surgery complaining of abdominal pain. Pt states he has had pancreatitis 2 times in the past. Pt drank about 3-4 cans of alcoholic seltzer on Saturday, abdominal pain began on Sunday and persisted until pt decided to come to ED. He also reported nausea/vomiting for 2-3 days. Pt states he used to drink alcohol more frequently, but recently decreased the amount of etoh intake. In ED upon my exam pt still having diffuse abdominal pain after receiving Tylenol. Denies diarrhea or further GI complaints.    Allergies:  No Known Allergies      Medications:  aluminum hydroxide/magnesium hydroxide/simethicone Suspension 30 milliLiter(s) Oral every 4 hours PRN      PMHX/PSHX:  H/O pyloric stenosis    Anxiety    Pancreatitis    Pneumonia, aspiration    History of repair of pyloric stenosis    History of excision of pilonidal cyst    S/P appendectomy    H/O bariatric surgery        Family history:  No pertinent family history in first degree relatives        Social History:     ROS:     General:  No fevers, chills, night sweats, fatigue,   Eyes:  Good vision, no reported pain  ENT:  No sore throat, pain, runny nose, dysphagia  CV:  No pain, palpitations, hypo/hypertension  Resp:  No dyspnea, cough, tachypnea, wheezing  GI:  +pain, +nausea, +vomiting, No diarrhea, No constipation, No weight loss, No fever, No pruritis, No rectal bleeding, No tarry stools, No dysphagia,  :  No pain, bleeding, incontinence, nocturia  Muscle:  No pain, weakness  Neuro:  No weakness, tingling, memory problems  Psych:  No fatigue, insomnia, mood problems, depression  Endocrine:  No polyuria, polydipsia, cold/heat intolerance  Heme:  No petechiae, ecchymosis, easy bruisability  Skin:  No rash, tattoos, scars, edema      PHYSICAL EXAM:   Vital Signs:  Vital Signs Last 24 Hrs  T(C): 36.5 (31 Dec 2024 08:23), Max: 36.5 (31 Dec 2024 08:23)  T(F): 97.7 (31 Dec 2024 08:23), Max: 97.7 (31 Dec 2024 08:23)  HR: 84 (31 Dec 2024 08:23) (84 - 84)  BP: 144/103 (31 Dec 2024 08:23) (144/103 - 144/103)  BP(mean): --  RR: 20 (31 Dec 2024 08:23) (20 - 20)  SpO2: 98% (31 Dec 2024 08:23) (98% - 98%)    Parameters below as of 31 Dec 2024 08:23  Patient On (Oxygen Delivery Method): room air      Daily Height in cm: 172.72 (31 Dec 2024 08:23)    Daily     GENERAL:  Appears stated age  HEENT:  NC/AT  CHEST:  Full & symmetric excursion  HEART:  Regular rhythm  ABDOMEN:  Soft, +diffusely tender, non-distended  EXTEREMITIES:  no cyanosis, clubbing or edema  SKIN:  No rash  NEURO:  Alert    LABS:                        17.9   12.58 )-----------( 178      ( 31 Dec 2024 10:20 )             48.9     12-31    134[L]  |  100  |  16  ----------------------------<  107[H]  3.1[L]   |  16[L]  |  0.84    Ca    10.0      31 Dec 2024 10:20    TPro  8.2  /  Alb  3.9  /  TBili  2.7[H]  /  DBili  x   /  AST  126[H]  /  ALT  93[H]  /  AlkPhos  89  12-31    LIVER FUNCTIONS - ( 31 Dec 2024 10:20 )  Alb: 3.9 g/dL / Pro: 8.2 g/dL / ALK PHOS: 89 U/L / ALT: 93 U/L / AST: 126 U/L / GGT: x             Urinalysis Basic - ( 31 Dec 2024 10:20 )    Color: x / Appearance: x / SG: x / pH: x  Gluc: 107 mg/dL / Ketone: x  / Bili: x / Urobili: x   Blood: x / Protein: x / Nitrite: x   Leuk Esterase: x / RBC: x / WBC x   Sq Epi: x / Non Sq Epi: x / Bacteria: x      Lipase [id number]        Imaging:  < from: CT Abdomen and Pelvis w/ Oral Cont and w/ IV Cont (12.31.24 @ 12:21) >    ACC: [id number] EXAM:  CT ABDOMEN AND PELVIS OC IC   ORDERED BY: BROOK ARIAS     PROCEDURE DATE:  12/31/2024          INTERPRETATION:  CLINICAL INFORMATION: Abdominal pain. History of   pancreatitis.    COMPARISON: July 22, 2024.    CONTRAST/COMPLICATIONS:  IV Contrast: Omnipaque 350  90 cc administered   10 cc discarded  Oral Contrast: Omnipaque 300  .    PROCEDURE:  CT of the Abdomen and Pelvis was performed.  Sagittal and coronal reformats were performed.    FINDINGS:  LOWER CHEST: Within normal limits.    LIVER: Moderate diffuse decreased attenuation of the liver, compatible   with steatosis. Otherwise, within normal limits. The hepatic veins and   portal vein are patent.  BILE DUCTS: Normal caliber.  GALLBLADDER: Within normal limits.  SPLEEN: Within normal limits.  PANCREAS: There is mild edematous prominence of the pancreatic neck and   body with mild peripancreatic fluid stranding extending into the   mesenteric root and retroperitoneum and along the pancreatoduodenal   groove. There is no evidence of pancreatic necrosis. The pancreatic duct   is not dilated.  ADRENALS: Within normal limits.  KIDNEYS/URETERS: Within normal limits.    BLADDER: Within normal limits.  REPRODUCTIVE ORGANS: The prostate is not enlarged.    BOWEL: Sleeve gastrectomy. No bowel obstruction. Appendix has been   removed.  PERITONEUM/RETROPERITONEUM: Within normal limits.  VESSELS: Within normal limits.  LYMPH NODES: No lymphadenopathy.  ABDOMINAL WALL: Within normal limits.  BONES: Within normal limits.    IMPRESSION: Acute interstitial pancreatitis of the neck and body.    --- End of Report ---    EMILY DAS MD; Attending Radiologist  This document has been electronically signed. Dec 31 2024 12:40PM    < end of copied text >

## 2024-12-31 NOTE — CONSULT NOTE ADULT - ASSESSMENT
Pancreatitis  Hx Gastric sleeve  Hepatic steatosis    CT positive for pancreatitis  Lipase noted  EtOH induced vs gallstone; bile duct normal on CT  Trend LFTs and bilirubin  NPO  IVF  Pain control  Will follow up  Discussed all of the above with pt    I reviewed the overnight course of events on the unit, re-confirming the patient history. I discussed the care with the patient  Differential diagnosis and plan of care discussed with patient after the evaluation  35 minutes spent on total encounter of which more than fifty percent of the encounter was spent counseling and/or coordinating care by the attending physician.

## 2024-12-31 NOTE — H&P ADULT - HISTORY OF PRESENT ILLNESS
39y Male PMHx gastric sleeve surgery complaining of abdominal pain. Pt states he has had pancreatitis 2 times in the past. Pt drank about 3-4 cans of alcoholic seltzer on Saturday, abdominal pain began on Sunday and persisted until pt decided to come to ED. He also reported nausea/vomiting for 2-3 days. Pt states he used to drink alcohol more frequently, but recently decreased the amount of etoh intake. In ED upon my exam pt still having diffuse abdominal pain after receiving Tylenol. Denies diarrhea or further GI complaints.

## 2025-01-01 LAB
ALBUMIN SERPL ELPH-MCNC: 3.1 G/DL — LOW (ref 3.3–5)
ALP SERPL-CCNC: 69 U/L — SIGNIFICANT CHANGE UP (ref 40–120)
ALT FLD-CCNC: 64 U/L — SIGNIFICANT CHANGE UP (ref 12–78)
ANION GAP SERPL CALC-SCNC: 6 MMOL/L — SIGNIFICANT CHANGE UP (ref 5–17)
AST SERPL-CCNC: 77 U/L — HIGH (ref 15–37)
BILIRUB SERPL-MCNC: 1.9 MG/DL — HIGH (ref 0.2–1.2)
BUN SERPL-MCNC: 4 MG/DL — LOW (ref 7–23)
CALCIUM SERPL-MCNC: 8.8 MG/DL — SIGNIFICANT CHANGE UP (ref 8.5–10.1)
CHLORIDE SERPL-SCNC: 101 MMOL/L — SIGNIFICANT CHANGE UP (ref 96–108)
CO2 SERPL-SCNC: 29 MMOL/L — SIGNIFICANT CHANGE UP (ref 22–31)
CREAT SERPL-MCNC: 0.65 MG/DL — SIGNIFICANT CHANGE UP (ref 0.5–1.3)
EGFR: 123 ML/MIN/1.73M2 — SIGNIFICANT CHANGE UP
GLUCOSE SERPL-MCNC: 116 MG/DL — HIGH (ref 70–99)
HCT VFR BLD CALC: 43.8 % — SIGNIFICANT CHANGE UP (ref 39–50)
HGB BLD-MCNC: 15.9 G/DL — SIGNIFICANT CHANGE UP (ref 13–17)
LIDOCAIN IGE QN: 739 U/L — HIGH (ref 13–75)
MCHC RBC-ENTMCNC: 35.3 PG — HIGH (ref 27–34)
MCHC RBC-ENTMCNC: 36.3 G/DL — HIGH (ref 32–36)
MCV RBC AUTO: 97.3 FL — SIGNIFICANT CHANGE UP (ref 80–100)
NRBC # BLD: 0 /100 WBCS — SIGNIFICANT CHANGE UP (ref 0–0)
PLATELET # BLD AUTO: 101 K/UL — LOW (ref 150–400)
POTASSIUM SERPL-MCNC: 3.3 MMOL/L — LOW (ref 3.5–5.3)
POTASSIUM SERPL-SCNC: 3.3 MMOL/L — LOW (ref 3.5–5.3)
PROT SERPL-MCNC: 7.1 G/DL — SIGNIFICANT CHANGE UP (ref 6–8.3)
RBC # BLD: 4.5 M/UL — SIGNIFICANT CHANGE UP (ref 4.2–5.8)
RBC # FLD: 12.4 % — SIGNIFICANT CHANGE UP (ref 10.3–14.5)
SODIUM SERPL-SCNC: 136 MMOL/L — SIGNIFICANT CHANGE UP (ref 135–145)
WBC # BLD: 12.16 K/UL — HIGH (ref 3.8–10.5)
WBC # FLD AUTO: 12.16 K/UL — HIGH (ref 3.8–10.5)

## 2025-01-01 RX ORDER — POTASSIUM CHLORIDE 600 MG/1
40 TABLET, FILM COATED, EXTENDED RELEASE ORAL ONCE
Refills: 0 | Status: COMPLETED | OUTPATIENT
Start: 2025-01-01 | End: 2025-01-01

## 2025-01-01 RX ORDER — ACETAMINOPHEN 80 MG/.8ML
650 SOLUTION/ DROPS ORAL EVERY 6 HOURS
Refills: 0 | Status: DISCONTINUED | OUTPATIENT
Start: 2025-01-01 | End: 2025-01-03

## 2025-01-01 RX ORDER — SODIUM CHLORIDE 9 MG/ML
1000 INJECTION, SOLUTION INTRAVENOUS
Refills: 0 | Status: DISCONTINUED | OUTPATIENT
Start: 2025-01-01 | End: 2025-01-01

## 2025-01-01 RX ADMIN — Medication 5 MILLIGRAM(S): at 21:57

## 2025-01-01 RX ADMIN — ACETAMINOPHEN 650 MILLIGRAM(S): 80 SOLUTION/ DROPS ORAL at 15:55

## 2025-01-01 RX ADMIN — ENOXAPARIN SODIUM 40 MILLIGRAM(S): 60 INJECTION INTRAVENOUS; SUBCUTANEOUS at 21:53

## 2025-01-01 RX ADMIN — POTASSIUM CHLORIDE 40 MILLIEQUIVALENT(S): 600 TABLET, FILM COATED, EXTENDED RELEASE ORAL at 17:01

## 2025-01-01 RX ADMIN — ACETAMINOPHEN 650 MILLIGRAM(S): 80 SOLUTION/ DROPS ORAL at 14:06

## 2025-01-02 LAB
ALBUMIN SERPL ELPH-MCNC: 3 G/DL — LOW (ref 3.3–5)
ALP SERPL-CCNC: 71 U/L — SIGNIFICANT CHANGE UP (ref 40–120)
ALT FLD-CCNC: 59 U/L — SIGNIFICANT CHANGE UP (ref 12–78)
ANION GAP SERPL CALC-SCNC: 9 MMOL/L — SIGNIFICANT CHANGE UP (ref 5–17)
AST SERPL-CCNC: 67 U/L — HIGH (ref 15–37)
BILIRUB SERPL-MCNC: 1.8 MG/DL — HIGH (ref 0.2–1.2)
BUN SERPL-MCNC: 7 MG/DL — SIGNIFICANT CHANGE UP (ref 7–23)
CALCIUM SERPL-MCNC: 9.2 MG/DL — SIGNIFICANT CHANGE UP (ref 8.5–10.1)
CHLORIDE SERPL-SCNC: 98 MMOL/L — SIGNIFICANT CHANGE UP (ref 96–108)
CO2 SERPL-SCNC: 29 MMOL/L — SIGNIFICANT CHANGE UP (ref 22–31)
CREAT SERPL-MCNC: 0.63 MG/DL — SIGNIFICANT CHANGE UP (ref 0.5–1.3)
EGFR: 124 ML/MIN/1.73M2 — SIGNIFICANT CHANGE UP
GLUCOSE SERPL-MCNC: 104 MG/DL — HIGH (ref 70–99)
HCT VFR BLD CALC: 43.3 % — SIGNIFICANT CHANGE UP (ref 39–50)
HGB BLD-MCNC: 15.5 G/DL — SIGNIFICANT CHANGE UP (ref 13–17)
LIDOCAIN IGE QN: 441 U/L — HIGH (ref 13–75)
MAGNESIUM SERPL-MCNC: 2.1 MG/DL — SIGNIFICANT CHANGE UP (ref 1.6–2.6)
MCHC RBC-ENTMCNC: 34.9 PG — HIGH (ref 27–34)
MCHC RBC-ENTMCNC: 35.8 G/DL — SIGNIFICANT CHANGE UP (ref 32–36)
MCV RBC AUTO: 97.5 FL — SIGNIFICANT CHANGE UP (ref 80–100)
NRBC # BLD: 0 /100 WBCS — SIGNIFICANT CHANGE UP (ref 0–0)
PLATELET # BLD AUTO: 130 K/UL — LOW (ref 150–400)
POTASSIUM SERPL-MCNC: 2.8 MMOL/L — CRITICAL LOW (ref 3.5–5.3)
POTASSIUM SERPL-SCNC: 2.8 MMOL/L — CRITICAL LOW (ref 3.5–5.3)
PROT SERPL-MCNC: 7.3 G/DL — SIGNIFICANT CHANGE UP (ref 6–8.3)
RBC # BLD: 4.44 M/UL — SIGNIFICANT CHANGE UP (ref 4.2–5.8)
RBC # FLD: 12.2 % — SIGNIFICANT CHANGE UP (ref 10.3–14.5)
SODIUM SERPL-SCNC: 136 MMOL/L — SIGNIFICANT CHANGE UP (ref 135–145)
WBC # BLD: 10.52 K/UL — HIGH (ref 3.8–10.5)
WBC # FLD AUTO: 10.52 K/UL — HIGH (ref 3.8–10.5)

## 2025-01-02 RX ORDER — POTASSIUM CHLORIDE 600 MG/1
10 TABLET, FILM COATED, EXTENDED RELEASE ORAL
Refills: 0 | Status: COMPLETED | OUTPATIENT
Start: 2025-01-02 | End: 2025-01-02

## 2025-01-02 RX ORDER — POTASSIUM CHLORIDE 600 MG/1
40 TABLET, FILM COATED, EXTENDED RELEASE ORAL EVERY 4 HOURS
Refills: 0 | Status: COMPLETED | OUTPATIENT
Start: 2025-01-02 | End: 2025-01-02

## 2025-01-02 RX ADMIN — ENOXAPARIN SODIUM 40 MILLIGRAM(S): 60 INJECTION INTRAVENOUS; SUBCUTANEOUS at 22:12

## 2025-01-02 RX ADMIN — Medication 5 MILLIGRAM(S): at 22:12

## 2025-01-02 RX ADMIN — POTASSIUM CHLORIDE 100 MILLIEQUIVALENT(S): 600 TABLET, FILM COATED, EXTENDED RELEASE ORAL at 09:34

## 2025-01-02 RX ADMIN — POTASSIUM CHLORIDE 40 MILLIEQUIVALENT(S): 600 TABLET, FILM COATED, EXTENDED RELEASE ORAL at 10:45

## 2025-01-02 RX ADMIN — POTASSIUM CHLORIDE 100 MILLIEQUIVALENT(S): 600 TABLET, FILM COATED, EXTENDED RELEASE ORAL at 12:38

## 2025-01-02 RX ADMIN — POTASSIUM CHLORIDE 100 MILLIEQUIVALENT(S): 600 TABLET, FILM COATED, EXTENDED RELEASE ORAL at 10:44

## 2025-01-02 RX ADMIN — Medication 30 MILLILITER(S): at 17:50

## 2025-01-02 RX ADMIN — POTASSIUM CHLORIDE 40 MILLIEQUIVALENT(S): 600 TABLET, FILM COATED, EXTENDED RELEASE ORAL at 13:56

## 2025-01-02 NOTE — SBIRT NOTE ADULT - NSSBIRTSIXOCC_GEN_A_CORE
Pt reports drinking heavily in the past, but more recently pt declines drinking heavily/Less than monthly Pt reports significant ETOH use in the past, but more recently pt declines drinking heavily/Less than monthly

## 2025-01-02 NOTE — SBIRT NOTE ADULT - NSSBIRTALCPOSREINDET_GEN_A_CORE
Pt shared that he has been able to cut down his etoh use in the last couple of months. Pt reports previously drinking heavily for years. Pt shared that the two times of drinking in this last month were due to the death of a friend. SWI provided support and positive reinforcement. SWI offered resources for etoh use and pt declined. SWI stated that support and resources are available at any point if the pt changes their mind. SWI to follow and remain available for any needs.

## 2025-01-02 NOTE — SBIRT NOTE ADULT - NSSBIRTUNABLESTOP_GEN_A_CORE
Pt may have struggled stopping drinking in the past year, but pt stated that he has been able to slow and cut down his drinking without outside resources. Pt had difficulty with excessive ETOH use in the past year, but pt stated that he has been able to slow and cut down his drinking without outside resources.

## 2025-01-02 NOTE — SBIRT NOTE ADULT - NSSBIRTOFTENALCOHOL_GEN_A_CORE
Pt reports hx of heavy drinking, but within the last months, pt reports significant decrease in drinking. In the last month, pt reports drinking 2 times./2 to 4 times a month Pt reports hx of heavy ETOH use but within the last 2 months, pt reports significant decrease in drinking. In the last month, pt reports ETOH use 2 times./2 to 4 times a month

## 2025-01-03 ENCOUNTER — TRANSCRIPTION ENCOUNTER (OUTPATIENT)
Age: 40
End: 2025-01-03

## 2025-01-03 VITALS
TEMPERATURE: 99 F | OXYGEN SATURATION: 99 % | HEART RATE: 85 BPM | DIASTOLIC BLOOD PRESSURE: 94 MMHG | SYSTOLIC BLOOD PRESSURE: 129 MMHG | RESPIRATION RATE: 18 BRPM

## 2025-01-03 LAB
ALBUMIN SERPL ELPH-MCNC: 3.1 G/DL — LOW (ref 3.3–5)
ALP SERPL-CCNC: 79 U/L — SIGNIFICANT CHANGE UP (ref 40–120)
ALT FLD-CCNC: 56 U/L — SIGNIFICANT CHANGE UP (ref 12–78)
ANION GAP SERPL CALC-SCNC: 7 MMOL/L — SIGNIFICANT CHANGE UP (ref 5–17)
AST SERPL-CCNC: 61 U/L — HIGH (ref 15–37)
BILIRUB SERPL-MCNC: 1.8 MG/DL — HIGH (ref 0.2–1.2)
BUN SERPL-MCNC: 10 MG/DL — SIGNIFICANT CHANGE UP (ref 7–23)
CALCIUM SERPL-MCNC: 9.1 MG/DL — SIGNIFICANT CHANGE UP (ref 8.5–10.1)
CHLORIDE SERPL-SCNC: 101 MMOL/L — SIGNIFICANT CHANGE UP (ref 96–108)
CO2 SERPL-SCNC: 26 MMOL/L — SIGNIFICANT CHANGE UP (ref 22–31)
CREAT SERPL-MCNC: 0.6 MG/DL — SIGNIFICANT CHANGE UP (ref 0.5–1.3)
EGFR: 126 ML/MIN/1.73M2 — SIGNIFICANT CHANGE UP
GLUCOSE SERPL-MCNC: 131 MG/DL — HIGH (ref 70–99)
HCT VFR BLD CALC: 44.1 % — SIGNIFICANT CHANGE UP (ref 39–50)
HGB BLD-MCNC: 16 G/DL — SIGNIFICANT CHANGE UP (ref 13–17)
MCHC RBC-ENTMCNC: 35.1 PG — HIGH (ref 27–34)
MCHC RBC-ENTMCNC: 36.3 G/DL — HIGH (ref 32–36)
MCV RBC AUTO: 96.7 FL — SIGNIFICANT CHANGE UP (ref 80–100)
NRBC # BLD: 0 /100 WBCS — SIGNIFICANT CHANGE UP (ref 0–0)
PLATELET # BLD AUTO: 175 K/UL — SIGNIFICANT CHANGE UP (ref 150–400)
POTASSIUM SERPL-MCNC: 3.1 MMOL/L — LOW (ref 3.5–5.3)
POTASSIUM SERPL-SCNC: 3.1 MMOL/L — LOW (ref 3.5–5.3)
PROT SERPL-MCNC: 7.2 G/DL — SIGNIFICANT CHANGE UP (ref 6–8.3)
RBC # BLD: 4.56 M/UL — SIGNIFICANT CHANGE UP (ref 4.2–5.8)
RBC # FLD: 12.2 % — SIGNIFICANT CHANGE UP (ref 10.3–14.5)
SODIUM SERPL-SCNC: 134 MMOL/L — LOW (ref 135–145)
WBC # BLD: 6.72 K/UL — SIGNIFICANT CHANGE UP (ref 3.8–10.5)
WBC # FLD AUTO: 6.72 K/UL — SIGNIFICANT CHANGE UP (ref 3.8–10.5)

## 2025-01-03 PROCEDURE — 96376 TX/PRO/DX INJ SAME DRUG ADON: CPT

## 2025-01-03 PROCEDURE — 85027 COMPLETE CBC AUTOMATED: CPT

## 2025-01-03 PROCEDURE — 96374 THER/PROPH/DIAG INJ IV PUSH: CPT

## 2025-01-03 PROCEDURE — 83735 ASSAY OF MAGNESIUM: CPT

## 2025-01-03 PROCEDURE — 80053 COMPREHEN METABOLIC PANEL: CPT

## 2025-01-03 PROCEDURE — 99285 EMERGENCY DEPT VISIT HI MDM: CPT

## 2025-01-03 PROCEDURE — 74177 CT ABD & PELVIS W/CONTRAST: CPT | Mod: MC

## 2025-01-03 PROCEDURE — 83690 ASSAY OF LIPASE: CPT

## 2025-01-03 PROCEDURE — 96375 TX/PRO/DX INJ NEW DRUG ADDON: CPT

## 2025-01-03 PROCEDURE — 85025 COMPLETE CBC W/AUTO DIFF WBC: CPT

## 2025-01-03 PROCEDURE — 36415 COLL VENOUS BLD VENIPUNCTURE: CPT

## 2025-01-03 RX ORDER — POTASSIUM CHLORIDE 600 MG/1
40 TABLET, FILM COATED, EXTENDED RELEASE ORAL EVERY 4 HOURS
Refills: 0 | Status: COMPLETED | OUTPATIENT
Start: 2025-01-03 | End: 2025-01-03

## 2025-01-03 RX ORDER — OMEPRAZOLE MAGNESIUM 20 MG/1
1 CAPSULE, DELAYED RELEASE ORAL
Qty: 30 | Refills: 0
Start: 2025-01-03

## 2025-01-03 RX ADMIN — POTASSIUM CHLORIDE 40 MILLIEQUIVALENT(S): 600 TABLET, FILM COATED, EXTENDED RELEASE ORAL at 15:38

## 2025-01-03 RX ADMIN — POTASSIUM CHLORIDE 40 MILLIEQUIVALENT(S): 600 TABLET, FILM COATED, EXTENDED RELEASE ORAL at 11:27

## 2025-01-03 RX ADMIN — Medication 30 MILLILITER(S): at 12:51

## 2025-01-03 NOTE — DISCHARGE NOTE NURSING/CASE MANAGEMENT/SOCIAL WORK - FINANCIAL ASSISTANCE
[facility] provides services at a reduced cost to those who are determined to be eligible through [facility]’s financial assistance program. Information regarding [facility]’s financial assistance program can be found by going to [email or web address] or by calling [phone].

## 2025-01-03 NOTE — PROGRESS NOTE ADULT - ASSESSMENT
Pancreatitis  Hx Gastric sleeve  Hepatic steatosis    EtOH induced vs gallstone; bile duct normal on CT  Trend LFTs and bilirubin  advance diet as tolerated, ok to advance to reg low fat diet for dinner today 1/2/25  IVF  Pain control  Replete potassium  Possible d/c tomorrow  Discussed all of the above with pt    I reviewed the overnight course of events on the unit, re-confirming the patient history. I discussed the care with the patient  Differential diagnosis and plan of care discussed with patient after the evaluation  35 minutes spent on total encounter of which more than fifty percent of the encounter was spent counseling and/or coordinating care by the attending physician.
39M with depression, gastric sleeve, ETOH pancreatitis, admitted with the same    Acute pancreatitis  symptoms improving  trial of clears  zofran prn  pain control  GI following  discussed complete etoh abstinence given recurrent pancreatitis    hypokalemia  replete    DVT ppx      [facility]   
39M with depression, gastric sleeve, ETOH pancreatitis, admitted with the same    Acute pancreatitis  symptoms improving  advancing diet  zofran prn  pain control  GI following  discussed complete etoh abstinence given recurrent pancreatitis    hypokalemia  replete    DVT ppx      [employer]   
Pancreatitis  Hx Gastric sleeve  Hepatic steatosis    EtOH induced vs gallstone; bile duct normal on CT  Trend LFTs and bilirubin  advance diet as tolerated   IVF  Pain control  Will follow up  Discussed all of the above with pt      David Saavedra M.D.  [facility]  [phone]    I reviewed the overnight course of events on the unit, re-confirming the patient history. I discussed the care with the patient  Differential diagnosis and plan of care discussed with patient after the evaluation  35 minutes spent on total encounter of which more than fifty percent of the encounter was spent counseling and/or coordinating care by the attending physician.  
Pancreatitis  Hx Gastric sleeve  Hepatic steatosis    Recommendations:  - EtOH induced vs gallstone; bile duct normal on CT  - Trend LFTs and bilirubin  - Low fat diet as tolerated   - Pain control  - Trend electrolytes, replete PRN  - No contraindication from GI standpoint for d/c planning  - ETOH cessation and counseling provided   - To follow       I reviewed the overnight course of events on the unit, re-confirming the patient history. I discussed the care with the patient  Differential diagnosis and plan of care discussed with patient after the evaluation  35 minutes spent on total encounter of which more than fifty percent of the encounter was spent counseling and/or coordinating care by the attending physician.

## 2025-01-03 NOTE — DISCHARGE NOTE PROVIDER - CARE PROVIDER_API CALL
Mina Gallardo  Internal Medicine  [address]  [place] [address]  Phone: [phone]  Fax: [phone]  Follow Up Time:

## 2025-01-03 NOTE — DISCHARGE NOTE NURSING/CASE MANAGEMENT/SOCIAL WORK - NSDCPEFALRISK_GEN_ALL_CORE
For information on Fall & Injury Prevention, visit: [email or web address] OR  [email or web address] OR  https://www.cdc.gov/steadi/patient.html

## 2025-01-03 NOTE — CASE MANAGEMENT PROGRESS NOTE - NSCMPROGRESSNOTE_GEN_ALL_CORE
Per MD pt is medically cleared for discharge today 1/3/25. CM met with patient to discuss transition of care. Pt is to be transitioned to home with no formal home care services, pt offered home care services, pt declined home care services. CM explained home care services, expectations, process, insurance provisions and home safety with pt verbalizing understanding.  Pt states his mother will assist post discharge. Pt aware of plan and in agreement / verbalizes understanding. Discharge notice discussed / copy given. Pt verbalized understanding. Confirmed pharmacy is [facility]. community MD is Dr. Gallardo. Pt stated they would make their own follow up appointments. Pt denies DME and need for usage. Pt stated his mother will transport pt home. All questions answered. CM discussed plan with MD and RN. CM to remain available through hospitalization.

## 2025-01-03 NOTE — DISCHARGE NOTE NURSING/CASE MANAGEMENT/SOCIAL WORK - PATIENT PORTAL LINK FT
You can access the FollowMyHealth Patient Portal offered by [facility] by registering at the following website: [email or web address]. By joining [facility]’s FollowMyHealth portal, you will also be able to view your health information using other applications (apps) compatible with our system.

## 2025-01-03 NOTE — DISCHARGE NOTE PROVIDER - NSDCMRMEDTOKEN_GEN_ALL_CORE_FT
omeprazole 40 mg oral delayed release capsule: 1 cap(s) orally once a day  PROzac 10 mg oral tablet: 3 tab(s) orally once a day

## 2025-01-03 NOTE — PROGRESS NOTE ADULT - SUBJECTIVE AND OBJECTIVE BOX
[facility]    Luis Enrique Rojo NP    [address] [place] [address]  [phone]      Chief Complaint:  Patient is a 39y old  Male who presents with a chief complaint of     HPI/ 24 hr events:   Patient seen and examined at bedside  Reports feeling well this morning   No fever, chills, nausea, vomiting or abdominal pain   Tolerating low fat diet       REVIEW OF SYSTEMS:   General: Negative  HEENT: Negative  CV: Negative  Respiratory: Negative  GI: See HPI  : Negative  MSK: Negative  Hematologic: Negative  Skin: Negative    MEDICATIONS:   MEDICATIONS  (STANDING):  enoxaparin Injectable 40 milliGRAM(s) SubCutaneous every 24 hours  potassium chloride    Tablet ER 40 milliEquivalent(s) Oral every 4 hours    MEDICATIONS  (PRN):  acetaminophen     Tablet .. 650 milliGRAM(s) Oral every 6 hours PRN Temp greater or equal to 38C (100.4F), Mild Pain (1 - 3)  aluminum hydroxide/magnesium hydroxide/simethicone Suspension 30 milliLiter(s) Oral every 4 hours PRN Dyspepsia  melatonin 5 milliGRAM(s) Oral at bedtime PRN Insomnia  morphine  - Injectable 2 milliGRAM(s) IV Push every 4 hours PRN Moderate Pain (4 - 6)  morphine  - Injectable 4 milliGRAM(s) IV Push every 6 hours PRN Severe Pain (7 - 10)  ondansetron Injectable 4 milliGRAM(s) IV Push every 8 hours PRN Nausea and/or Vomiting      ALLERGIES:   Allergies    No Known Allergies    Intolerances        VITAL SIGNS:   Vital Signs Last 24 Hrs  T(C): 37.1 (03 Jan 2025 11:18), Max: 37.1 (02 Jan 2025 20:11)  T(F): 98.8 (03 Jan 2025 11:18), Max: 98.8 (03 Jan 2025 11:18)  HR: 85 (03 Jan 2025 11:18) (73 - 86)  BP: 129/94 (03 Jan 2025 11:18) (129/94 - 142/98)  BP(mean): --  RR: 18 (03 Jan 2025 11:18) (18 - 18)  SpO2: 99% (03 Jan 2025 11:18) (98% - 99%)    Parameters below as of 03 Jan 2025 11:18  Patient On (Oxygen Delivery Method): room air      I&O's Summary      PHYSICAL EXAM:   GENERAL:  No acute distress  HEENT:  NC/AT  CHEST:  No increased effort  HEART:  Regular rate  ABDOMEN:  Soft, non-tender, non-distended  EXTREMITIES: No cyanosis  SKIN:  Warm, dry  NEURO:  Calm, cooperative    LABS:                        16.0   6.72  )-----------( 175      ( 03 Jan 2025 08:20 )             44.1     01-03    134[L]  |  101  |  10  ----------------------------<  131[H]  3.1[L]   |  26  |  0.60    Ca    9.1      03 Jan 2025 08:20  Mg     2.1     01-02    TPro  7.2  /  Alb  3.1[L]  /  TBili  1.8[H]  /  DBili  x   /  AST  61[H]  /  ALT  56  /  AlkPhos  79  01-03    LIVER FUNCTIONS - ( 03 Jan 2025 08:20 )  Alb: 3.1 g/dL / Pro: 7.2 g/dL / ALK PHOS: 79 U/L / ALT: 56 U/L / AST: 61 U/L / GGT: x                                             RADIOLOGY & ADDITIONAL STUDIES:  
Patient is a 39y old  Male who presents with a chief complaint of       INTERVAL HPI/OVERNIGHT EVENTS:   no complaints  pt seen and examined         Vital Signs Last 24 Hrs  T(C): 36.8 (01 Jan 2025 11:48), Max: 37.3 (31 Dec 2024 20:08)  T(F): 98.3 (01 Jan 2025 11:48), Max: 99.1 (31 Dec 2024 20:08)  HR: 102 (01 Jan 2025 11:48) (66 - 102)  BP: 120/87 (01 Jan 2025 11:48) (120/87 - 142/89)  BP(mean): --  RR: 18 (01 Jan 2025 11:48) (18 - 18)  SpO2: 98% (01 Jan 2025 11:48) (97% - 99%)    Parameters below as of 01 Jan 2025 11:48  Patient On (Oxygen Delivery Method): room air        acetaminophen     Tablet .. 650 milliGRAM(s) Oral every 6 hours PRN  aluminum hydroxide/magnesium hydroxide/simethicone Suspension 30 milliLiter(s) Oral every 4 hours PRN  dextrose 5% + sodium chloride 0.9% with potassium chloride 20 mEq/L 1000 milliLiter(s) IV Continuous <Continuous>  enoxaparin Injectable 40 milliGRAM(s) SubCutaneous every 24 hours  melatonin 5 milliGRAM(s) Oral at bedtime PRN  morphine  - Injectable 2 milliGRAM(s) IV Push every 4 hours PRN  morphine  - Injectable 4 milliGRAM(s) IV Push every 6 hours PRN  ondansetron Injectable 4 milliGRAM(s) IV Push every 8 hours PRN  potassium chloride    Tablet ER 40 milliEquivalent(s) Oral once      PHYSICAL EXAM:  GENERAL: NAD   EYES: conjunctiva and sclera clear  ENMT: Moist mucous membranes  NECK: Supple, No JVD, Normal thyroid  CHEST/LUNG: non labored, cta b/l  HEART: Regular rate and rhythm; No murmurs, rubs, or gallops  ABDOMEN: Soft, Nontender, Nondistended; Bowel sounds present  EXTREMITIES:  No clubbing, no cyanosis, no edema  LYMPH: No lymphadenopathy noted  SKIN: No rashes or lesions  NEURO: no new focal deficits    Consultant(s) Notes Reviewed:  [x ] YES  [ ] NO  Care Discussed with Consultants/Other Providers [ x] YES  [ ] NO    LABS:                        15.9   12.16 )-----------( 101      ( 01 Jan 2025 07:50 )             43.8     01-01    136  |  101  |  4[L]  ----------------------------<  116[H]  3.3[L]   |  29  |  0.65    Ca    8.8      01 Jan 2025 07:50    TPro  7.1  /  Alb  3.1[L]  /  TBili  1.9[H]  /  DBili  x   /  AST  77[H]  /  ALT  64  /  AlkPhos  69  01-01      Urinalysis Basic - ( 01 Jan 2025 07:50 )    Color: x / Appearance: x / SG: x / pH: x  Gluc: 116 mg/dL / Ketone: x  / Bili: x / Urobili: x   Blood: x / Protein: x / Nitrite: x   Leuk Esterase: x / RBC: x / WBC x   Sq Epi: x / Non Sq Epi: x / Bacteria: x      CAPILLARY BLOOD GLUCOSE            Urinalysis Basic - ( 01 Jan 2025 07:50 )    Color: x / Appearance: x / SG: x / pH: x  Gluc: 116 mg/dL / Ketone: x  / Bili: x / Urobili: x   Blood: x / Protein: x / Nitrite: x   Leuk Esterase: x / RBC: x / WBC x   Sq Epi: x / Non Sq Epi: x / Bacteria: x          RADIOLOGY & ADDITIONAL TESTS:    Imaging Personally Reviewed  Reviewed consultants input
[facility]  Natan Douglass PA-C  [address] [place] [address]  [phone]      INTERVAL HPI/OVERNIGHT EVENTS: no acute events over night     MEDICATIONS  (STANDING):  enoxaparin Injectable 40 milliGRAM(s) SubCutaneous every 24 hours  potassium chloride    Tablet ER 40 milliEquivalent(s) Oral once    MEDICATIONS  (PRN):  acetaminophen     Tablet .. 650 milliGRAM(s) Oral every 6 hours PRN Temp greater or equal to 38C (100.4F), Mild Pain (1 - 3)  aluminum hydroxide/magnesium hydroxide/simethicone Suspension 30 milliLiter(s) Oral every 4 hours PRN Dyspepsia  melatonin 5 milliGRAM(s) Oral at bedtime PRN Insomnia  morphine  - Injectable 2 milliGRAM(s) IV Push every 4 hours PRN Moderate Pain (4 - 6)  morphine  - Injectable 4 milliGRAM(s) IV Push every 6 hours PRN Severe Pain (7 - 10)  ondansetron Injectable 4 milliGRAM(s) IV Push every 8 hours PRN Nausea and/or Vomiting      Allergies    No Known Allergies    Intolerances        ROS:   General:  No  fevers, chills, night sweats, fatigue,   Eyes:  Good vision, no reported pain  ENT:  No sore throat, pain, runny nose, dysphagia  CV:  No pain, palpitations, hypo/hypertension  Resp:  No dyspnea, cough, tachypnea, wheezing  GI:  No pain, No nausea, No vomiting, No diarrhea, No constipation, No weight loss, No fever, No pruritis, No rectal bleeding, No tarry stools, No dysphagia,  :  No pain, bleeding, incontinence, nocturia  Muscle:  No pain, weakness  Neuro:  No weakness, tingling, memory problems  Psych:  No fatigue, insomnia, mood problems, depression  Endocrine:  No polyuria, polydipsia, cold/heat intolerance  Heme:  No petechiae, ecchymosis, easy bruisability  Skin:  No rash, tattoos, scars, edema      PHYSICAL EXAM:   Vital Signs:  Vital Signs Last 24 Hrs  T(C): 36.8 (2025 11:48), Max: 37.3 (31 Dec 2024 20:08)  T(F): 98.3 (2025 11:48), Max: 99.1 (31 Dec 2024 20:08)  HR: 102 (2025 11:48) (66 - 102)  BP: 120/87 (2025 11:48) (120/87 - 142/89)  BP(mean): --  RR: 18 (2025 11:48) (18 - 18)  SpO2: 98% (2025 11:48) (97% - 99%)    Parameters below as of 2025 11:48  Patient On (Oxygen Delivery Method): room air      Daily     Daily Weight in k.5 (2025 06:31)    GENERAL:  Appears stated age,   HEENT:  NC/AT,    CHEST:  Full & symmetric excursion,   HEART:  Regular rhythm,  ABDOMEN:  Soft, non-tender, non-distended,  EXTEREMITIES:  no cyanosis  SKIN:  No rash  NEURO:  Alert,       LABS:                        15.9   12.16 )-----------( 101      ( 2025 07:50 )             43.8         136  |  101  |  4[L]  ----------------------------<  116[H]  3.3[L]   |  29  |  0.65    Ca    8.8      2025 07:50    TPro  7.1  /  Alb  3.1[L]  /  TBili  1.9[H]  /  DBili  x   /  AST  77[H]  /  ALT  64  /  AlkPhos  69        Urinalysis Basic - ( 2025 07:50 )    Color: x / Appearance: x / SG: x / pH: x  Gluc: 116 mg/dL / Ketone: x  / Bili: x / Urobili: x   Blood: x / Protein: x / Nitrite: x   Leuk Esterase: x / RBC: x / WBC x   Sq Epi: x / Non Sq Epi: x / Bacteria: x        RADIOLOGY & ADDITIONAL TESTS:  
Patient is a 39y old  Male who presents with a chief complaint of       INTERVAL HPI/OVERNIGHT EVENTS:   no complaints  pt seen and examined         Vital Signs Last 24 Hrs  T(C): 36.4 (02 Jan 2025 11:14), Max: 37.5 (01 Jan 2025 20:41)  T(F): 97.5 (02 Jan 2025 11:14), Max: 99.5 (01 Jan 2025 20:41)  HR: 94 (02 Jan 2025 11:14) (89 - 94)  BP: 134/98 (02 Jan 2025 12:05) (132/96 - 145/109)  BP(mean): --  RR: 18 (02 Jan 2025 11:14) (18 - 18)  SpO2: 98% (02 Jan 2025 11:14) (97% - 98%)    Parameters below as of 02 Jan 2025 11:14  Patient On (Oxygen Delivery Method): room air        acetaminophen     Tablet .. 650 milliGRAM(s) Oral every 6 hours PRN  aluminum hydroxide/magnesium hydroxide/simethicone Suspension 30 milliLiter(s) Oral every 4 hours PRN  enoxaparin Injectable 40 milliGRAM(s) SubCutaneous every 24 hours  melatonin 5 milliGRAM(s) Oral at bedtime PRN  morphine  - Injectable 2 milliGRAM(s) IV Push every 4 hours PRN  morphine  - Injectable 4 milliGRAM(s) IV Push every 6 hours PRN  ondansetron Injectable 4 milliGRAM(s) IV Push every 8 hours PRN      PHYSICAL EXAM:  GENERAL: NAD   EYES: conjunctiva and sclera clear  ENMT: Moist mucous membranes  NECK: Supple, No JVD, Normal thyroid  CHEST/LUNG: non labored, cta b/l  HEART: Regular rate and rhythm; No murmurs, rubs, or gallops  ABDOMEN: Soft, Nontender, Nondistended; Bowel sounds present  EXTREMITIES:  No clubbing, no cyanosis, no edema  LYMPH: No lymphadenopathy noted  SKIN: No rashes or lesions  NEURO: no new focal deficits    Consultant(s) Notes Reviewed:  [x ] YES  [ ] NO  Care Discussed with Consultants/Other Providers [ x] YES  [ ] NO    LABS:                        15.5   10.52 )-----------( 130      ( 02 Jan 2025 07:30 )             43.3     01-02    136  |  98  |  7   ----------------------------<  104[H]  2.8[LL]   |  29  |  0.63    Ca    9.2      02 Jan 2025 07:30  Mg     2.1     01-02    TPro  7.3  /  Alb  3.0[L]  /  TBili  1.8[H]  /  DBili  x   /  AST  67[H]  /  ALT  59  /  AlkPhos  71  01-02      Urinalysis Basic - ( 02 Jan 2025 07:30 )    Color: x / Appearance: x / SG: x / pH: x  Gluc: 104 mg/dL / Ketone: x  / Bili: x / Urobili: x   Blood: x / Protein: x / Nitrite: x   Leuk Esterase: x / RBC: x / WBC x   Sq Epi: x / Non Sq Epi: x / Bacteria: x      CAPILLARY BLOOD GLUCOSE            Urinalysis Basic - ( 02 Jan 2025 07:30 )    Color: x / Appearance: x / SG: x / pH: x  Gluc: 104 mg/dL / Ketone: x  / Bili: x / Urobili: x   Blood: x / Protein: x / Nitrite: x   Leuk Esterase: x / RBC: x / WBC x   Sq Epi: x / Non Sq Epi: x / Bacteria: x          RADIOLOGY & ADDITIONAL TESTS:    Imaging Personally Reviewed  Reviewed consultants input
[facility]  Natan Ordaz PA-C  [address] [place] [id number]  [phone]      INTERVAL HPI/OVERNIGHT EVENTS:  Pt s/e  Abdominal pain resolving  Denies further GI complaints    MEDICATIONS  (STANDING):  enoxaparin Injectable 40 milliGRAM(s) SubCutaneous every 24 hours  potassium chloride    Tablet ER 40 milliEquivalent(s) Oral every 4 hours    MEDICATIONS  (PRN):  acetaminophen     Tablet .. 650 milliGRAM(s) Oral every 6 hours PRN Temp greater or equal to 38C (100.4F), Mild Pain (1 - 3)  aluminum hydroxide/magnesium hydroxide/simethicone Suspension 30 milliLiter(s) Oral every 4 hours PRN Dyspepsia  melatonin 5 milliGRAM(s) Oral at bedtime PRN Insomnia  morphine  - Injectable 2 milliGRAM(s) IV Push every 4 hours PRN Moderate Pain (4 - 6)  morphine  - Injectable 4 milliGRAM(s) IV Push every 6 hours PRN Severe Pain (7 - 10)  ondansetron Injectable 4 milliGRAM(s) IV Push every 8 hours PRN Nausea and/or Vomiting      Allergies    No Known Allergies      PHYSICAL EXAM:   Vital Signs:  Vital Signs Last 24 Hrs  T(C): 36.4 (2025 11:14), Max: 37.5 (2025 20:41)  T(F): 97.5 (2025 11:14), Max: 99.5 (2025 20:41)  HR: 94 (2025 11:14) (89 - 94)  BP: 145/109 (2025 11:14) (132/96 - 145/109)  BP(mean): --  RR: 18 (2025 11:14) (18 - 18)  SpO2: 98% (2025 11:14) (97% - 98%)    Parameters below as of 2025 11:14  Patient On (Oxygen Delivery Method): room air      Daily     Daily Weight in k.5 (2025 06:03)    GENERAL:  Appears stated age  HEENT:  NC/AT  CHEST:  Full & symmetric excursion  HEART:  Regular rhythm  ABDOMEN:  Soft, non-tender, non-distended  EXTEREMITIES:  no cyanosis  SKIN:  No rash  NEURO:  Alert      LABS:                        15.5   10.52 )-----------( 130      ( 2025 07:30 )             43.3     01-    136  |  98  |  7   ----------------------------<  104[H]  2.8[LL]   |  29  |  0.63    Ca    9.2      2025 07:30  Mg     2.1     -    TPro  7.3  /  Alb  3.0[L]  /  TBili  1.8[H]  /  DBili  x   /  AST  67[H]  /  ALT  59  /  AlkPhos  71  -      Urinalysis Basic - ( 2025 07:30 )    Color: x / Appearance: x / SG: x / pH: x  Gluc: 104 mg/dL / Ketone: x  / Bili: x / Urobili: x   Blood: x / Protein: x / Nitrite: x   Leuk Esterase: x / RBC: x / WBC x   Sq Epi: x / Non Sq Epi: x / Bacteria: x

## 2025-01-03 NOTE — DISCHARGE NOTE PROVIDER - PROVIDER TOKENS
PT Name: Donald Warren Abadie  MR #: [id number]    Physician Query Form - Atrial Flutter Specificity Clarification     CDS/: Christen Ahmadi               Contact information:[email or web address]  This form is a permanent document in the medical record.     Query Date: March 21, 2019    By submitting this query, we are merely seeking further clarification of documentation. Please utilize your independent clinical judgment when addressing the question(s) below.    The medical record contains the following:   Indicators     Supporting Clinical Findings Location in Medical Record   x Atrial Flutter Atrial flutter          Cardiology pn 3-20   x EKG results 3/20 EKG shows A-flutter    Cardiology pn 3-20   x Medication Digoxin started      Cardiology pn 3-20    Treatment      Other       Provider, please further specify the Atrial Flutter diagnosis.    [   ] Atypical   [   ] Type I   [   ] Type II   [ x  ] Typical   [   ] Other (please specify):   [  ] Clinically Undetermined         Please document in your progress notes daily for the duration of treatment until resolved, and include in your discharge summary.     PROVIDER:[TOKEN:[[id number]]]

## 2025-01-15 NOTE — ED PROVIDER NOTE - PRINCIPAL DIAGNOSIS
Pancreatitis Quality 317: Preventative Care And Screening: Screening For High Blood Pressure And Follow-Up Documented: Elevated or hypertensive blood pressure reading documented, and the indicated follow-up is documented Detail Level: Detailed